# Patient Record
Sex: FEMALE | Race: WHITE
[De-identification: names, ages, dates, MRNs, and addresses within clinical notes are randomized per-mention and may not be internally consistent; named-entity substitution may affect disease eponyms.]

---

## 2018-01-30 ENCOUNTER — HOSPITAL ENCOUNTER (OUTPATIENT)
Age: 48
End: 2018-01-30
Payer: COMMERCIAL

## 2018-01-30 DIAGNOSIS — R80.9: Primary | ICD-10-CM

## 2018-01-30 LAB
APTT PPP: 32 SECONDS (ref 24.1–36.2)
PROTHROMBIN TIME (PROTIME)PT.: 12.9 SECONDS (ref 11.7–14.9)

## 2018-01-30 PROCEDURE — 85730 THROMBOPLASTIN TIME PARTIAL: CPT

## 2018-01-30 PROCEDURE — 36415 COLL VENOUS BLD VENIPUNCTURE: CPT

## 2018-01-30 PROCEDURE — 85610 PROTHROMBIN TIME: CPT

## 2018-02-01 ENCOUNTER — HOSPITAL ENCOUNTER (OUTPATIENT)
Age: 48
End: 2018-02-01
Payer: COMMERCIAL

## 2018-02-01 VITALS
OXYGEN SATURATION: 95 % | HEART RATE: 60 BPM | SYSTOLIC BLOOD PRESSURE: 102 MMHG | RESPIRATION RATE: 14 BRPM | DIASTOLIC BLOOD PRESSURE: 39 MMHG

## 2018-02-01 VITALS
DIASTOLIC BLOOD PRESSURE: 57 MMHG | SYSTOLIC BLOOD PRESSURE: 143 MMHG | OXYGEN SATURATION: 95 % | HEART RATE: 76 BPM | TEMPERATURE: 98.78 F | RESPIRATION RATE: 14 BRPM

## 2018-02-01 VITALS — BODY MASS INDEX: 46 KG/M2

## 2018-02-01 DIAGNOSIS — R80.9: Primary | ICD-10-CM

## 2018-02-01 PROCEDURE — 99157 MOD SED OTHER PHYS/QHP EA: CPT

## 2018-02-01 PROCEDURE — 88346 IMFLUOR 1ST 1ANTB STAIN PX: CPT

## 2018-02-01 PROCEDURE — 50200 RENAL BIOPSY PERQ: CPT

## 2018-02-01 PROCEDURE — A4216 STERILE WATER/SALINE, 10 ML: HCPCS

## 2018-02-01 PROCEDURE — 88305 TISSUE EXAM BY PATHOLOGIST: CPT

## 2018-02-01 PROCEDURE — 99156 MOD SED OTH PHYS/QHP 5/>YRS: CPT

## 2018-02-01 PROCEDURE — 88348 ELECTRON MICROSCOPY DX: CPT

## 2018-02-01 PROCEDURE — 88313 SPECIAL STAINS GROUP 2: CPT

## 2018-02-01 PROCEDURE — 77012 CT SCAN FOR NEEDLE BIOPSY: CPT

## 2018-02-13 ENCOUNTER — HOSPITAL ENCOUNTER (OUTPATIENT)
Age: 48
End: 2018-02-13
Payer: COMMERCIAL

## 2018-02-13 DIAGNOSIS — R80.9: Primary | ICD-10-CM

## 2018-02-13 PROCEDURE — 86335 IMMUNFIX E-PHORSIS/URINE/CSF: CPT

## 2018-02-13 PROCEDURE — 36415 COLL VENOUS BLD VENIPUNCTURE: CPT

## 2018-02-13 PROCEDURE — 86334 IMMUNOFIX E-PHORESIS SERUM: CPT

## 2018-02-13 PROCEDURE — 82784 ASSAY IGA/IGD/IGG/IGM EACH: CPT

## 2018-02-13 PROCEDURE — 84166 PROTEIN E-PHORESIS/URINE/CSF: CPT

## 2018-02-13 PROCEDURE — 84165 PROTEIN E-PHORESIS SERUM: CPT

## 2018-02-14 LAB
ALBUMIN SERPL-SCNC: 3.4 G/DL (ref 2.9–4.4)
ALBUMIN: 3.4 G/DL (ref 2.9–4.4)
ALPHA2 GLOB 24H MFR UR ELPH: 2.6 %
GAMMA GLOB SERPL ELPH-MCNC: 1.1 G/DL (ref 0.4–1.8)
GAMMA GLOBULIN: 1.1 G/DL (ref 0.4–1.8)
IGA SERPL-MCNC: 547 MG/DL (ref 87–352)
IGG SERPL-MCNC: 847 MG/DL (ref 700–1600)
IGM SERPL-MCNC: 56 MG/DL (ref 26–217)
IMMUNOGLOBULIN A: 547 MG/DL (ref 87–352)
IMMUNOGLOBULIN G: 847 MG/DL (ref 700–1600)
IMMUNOGLOBULIN M: 56 MG/DL (ref 26–217)
PROEL- TOTAL PROTEIN: 7 G/DL (ref 6–8.5)
PROT SERPL-MCNC: 7 G/DL (ref 6–8.5)

## 2018-02-15 LAB — PROT UR-MCNC: 591 MG/DL

## 2018-05-11 ENCOUNTER — HOSPITAL ENCOUNTER (OUTPATIENT)
Age: 48
End: 2018-05-11
Payer: COMMERCIAL

## 2018-05-11 DIAGNOSIS — Z12.31: Primary | ICD-10-CM

## 2018-05-11 PROCEDURE — 77063 BREAST TOMOSYNTHESIS BI: CPT

## 2018-05-11 PROCEDURE — 77067 SCR MAMMO BI INCL CAD: CPT

## 2018-10-22 ENCOUNTER — HOSPITAL ENCOUNTER (OUTPATIENT)
Age: 48
End: 2018-10-22
Payer: COMMERCIAL

## 2018-10-22 DIAGNOSIS — C90.00: Primary | ICD-10-CM

## 2018-10-22 PROCEDURE — 86900 BLOOD TYPING SEROLOGIC ABO: CPT

## 2018-10-22 PROCEDURE — 86850 RBC ANTIBODY SCREEN: CPT

## 2019-02-08 ENCOUNTER — HOSPITAL ENCOUNTER (OUTPATIENT)
Age: 49
End: 2019-02-08
Payer: COMMERCIAL

## 2019-02-08 VITALS — BODY MASS INDEX: 46 KG/M2

## 2019-02-08 DIAGNOSIS — J32.9: Primary | ICD-10-CM

## 2019-02-08 DIAGNOSIS — J02.9: ICD-10-CM

## 2019-02-08 PROCEDURE — 87070 CULTURE OTHR SPECIMN AEROBIC: CPT

## 2019-02-08 PROCEDURE — 87205 SMEAR GRAM STAIN: CPT

## 2019-07-11 ENCOUNTER — HOSPITAL ENCOUNTER (OUTPATIENT)
Age: 49
End: 2019-07-11
Payer: COMMERCIAL

## 2019-07-11 VITALS — BODY MASS INDEX: 46 KG/M2

## 2019-07-11 DIAGNOSIS — N18.6: Primary | ICD-10-CM

## 2019-07-11 PROCEDURE — 93970 EXTREMITY STUDY: CPT

## 2019-07-19 ENCOUNTER — HOSPITAL ENCOUNTER (EMERGENCY)
Dept: HOSPITAL 100 - ED | Age: 49
Discharge: ANOTHER HOSPITAL | End: 2019-07-19
Payer: COMMERCIAL

## 2019-07-19 VITALS
HEART RATE: 66 BPM | SYSTOLIC BLOOD PRESSURE: 137 MMHG | DIASTOLIC BLOOD PRESSURE: 91 MMHG | TEMPERATURE: 98.78 F | OXYGEN SATURATION: 99 % | RESPIRATION RATE: 20 BRPM

## 2019-07-19 VITALS
HEART RATE: 65 BPM | RESPIRATION RATE: 17 BRPM | SYSTOLIC BLOOD PRESSURE: 135 MMHG | DIASTOLIC BLOOD PRESSURE: 84 MMHG | OXYGEN SATURATION: 98 %

## 2019-07-19 VITALS
SYSTOLIC BLOOD PRESSURE: 136 MMHG | RESPIRATION RATE: 17 BRPM | HEART RATE: 65 BPM | DIASTOLIC BLOOD PRESSURE: 88 MMHG | OXYGEN SATURATION: 97 %

## 2019-07-19 VITALS — BODY MASS INDEX: 46 KG/M2 | BODY MASS INDEX: 40.8 KG/M2

## 2019-07-19 DIAGNOSIS — Z87.891: ICD-10-CM

## 2019-07-19 DIAGNOSIS — R55: Primary | ICD-10-CM

## 2019-07-19 DIAGNOSIS — N18.6: ICD-10-CM

## 2019-07-19 DIAGNOSIS — T82.42XA: ICD-10-CM

## 2019-07-19 DIAGNOSIS — M25.531: ICD-10-CM

## 2019-07-19 DIAGNOSIS — F41.9: ICD-10-CM

## 2019-07-19 DIAGNOSIS — Z79.899: ICD-10-CM

## 2019-07-19 DIAGNOSIS — F32.9: ICD-10-CM

## 2019-07-19 DIAGNOSIS — Z99.2: ICD-10-CM

## 2019-07-19 LAB
ABSOLUTE NUCLEATED RBC COUNT: 0 10^3/UL (ref 0–5)
ANION GAP: 6 (ref 5–15)
BUN SERPL-MCNC: 22 MG/DL (ref 7–18)
BUN/CREAT RATIO: 5.4 RATIO (ref 10–20)
CALCIUM SERPL-MCNC: 10.1 MG/DL (ref 8.5–10.1)
CARBON DIOXIDE: 29 MMOL/L (ref 21–32)
CHLORIDE: 96 MMOL/L (ref 98–107)
DEPRECATED RDW RBC: 47.4 FL (ref 35.1–43.9)
ERYTHROCYTE [DISTWIDTH] IN BLOOD: 14 % (ref 11.6–14.6)
EST GLOM FILT RATE - AFR AMER: 15 ML/MIN (ref 60–?)
ESTIMATED CREATININE CLEARANCE: 13.32 ML/MIN
GLUCOSE: 126 MG/DL (ref 74–106)
HCT VFR BLD AUTO: 40.1 % (ref 37–47)
HEMOGLOBIN: 13.2 G/DL (ref 12–15)
HGB BLD-MCNC: 13.2 G/DL (ref 12–15)
IMMATURE GRANULOCYTES COUNT: 0.07 X10^3/UL (ref 0–0)
MCV RBC: 92.8 FL (ref 81–99)
MEAN CORP HGB CONC: 32.9 G/DL (ref 32–36)
MEAN PLATELET VOL.: 8.7 FL (ref 6.2–12)
NRBC FLAGGED BY ANALYZER: 0 % (ref 0–5)
PLATELET # BLD: 265 K/MM3 (ref 150–450)
PLATELET COUNT: 265 K/MM3 (ref 150–450)
POTASSIUM: 4.5 MMOL/L (ref 3.5–5.1)
RBC # BLD AUTO: 4.32 M/MM3 (ref 4.2–5.4)
RBC DISTRIBUTION WIDTH CV: 14 % (ref 11.6–14.6)
RBC DISTRIBUTION WIDTH SD: 47.4 FL (ref 35.1–43.9)
WBC # BLD AUTO: 8.5 K/MM3 (ref 4.4–11)
WHITE BLOOD COUNT: 8.5 K/MM3 (ref 4.4–11)

## 2019-07-19 PROCEDURE — A4216 STERILE WATER/SALINE, 10 ML: HCPCS

## 2019-07-19 PROCEDURE — 99285 EMERGENCY DEPT VISIT HI MDM: CPT

## 2019-07-19 PROCEDURE — 93005 ELECTROCARDIOGRAM TRACING: CPT

## 2019-07-19 PROCEDURE — 80048 BASIC METABOLIC PNL TOTAL CA: CPT

## 2019-07-19 PROCEDURE — 85025 COMPLETE CBC W/AUTO DIFF WBC: CPT

## 2019-07-19 PROCEDURE — 84484 ASSAY OF TROPONIN QUANT: CPT

## 2019-07-19 PROCEDURE — 70450 CT HEAD/BRAIN W/O DYE: CPT

## 2019-07-19 PROCEDURE — 73110 X-RAY EXAM OF WRIST: CPT

## 2019-07-19 PROCEDURE — 71045 X-RAY EXAM CHEST 1 VIEW: CPT

## 2019-11-12 ENCOUNTER — HOSPITAL ENCOUNTER (EMERGENCY)
Dept: HOSPITAL 100 - ED | Age: 49
Discharge: HOME | End: 2019-11-12
Payer: COMMERCIAL

## 2019-11-12 VITALS
TEMPERATURE: 98 F | RESPIRATION RATE: 16 BRPM | HEART RATE: 65 BPM | SYSTOLIC BLOOD PRESSURE: 183 MMHG | OXYGEN SATURATION: 96 % | DIASTOLIC BLOOD PRESSURE: 105 MMHG

## 2019-11-12 VITALS
OXYGEN SATURATION: 98 % | SYSTOLIC BLOOD PRESSURE: 131 MMHG | DIASTOLIC BLOOD PRESSURE: 62 MMHG | HEART RATE: 77 BPM | RESPIRATION RATE: 16 BRPM

## 2019-11-12 VITALS — BODY MASS INDEX: 40.8 KG/M2 | BODY MASS INDEX: 43.3 KG/M2

## 2019-11-12 DIAGNOSIS — T82.838A: Primary | ICD-10-CM

## 2019-11-12 DIAGNOSIS — N18.6: ICD-10-CM

## 2019-11-12 DIAGNOSIS — Z79.899: ICD-10-CM

## 2019-11-12 LAB
APTT PPP: 31.2 SECONDS (ref 24.1–36.2)
DEPRECATED RDW RBC: 56.9 FL (ref 35.1–43.9)
ERYTHROCYTE [DISTWIDTH] IN BLOOD: 15.5 % (ref 11.6–14.6)
HCT VFR BLD AUTO: 27.2 % (ref 37–47)
HEMOGLOBIN: 8.9 G/DL (ref 12–15)
HGB BLD-MCNC: 8.9 G/DL (ref 12–15)
IMMATURE GRANULOCYTES COUNT: 0.07 X10^3/UL (ref 0–0)
MCV RBC: 102.3 FL (ref 81–99)
MEAN CORP HGB CONC: 32.7 G/DL (ref 32–36)
MEAN PLATELET VOL.: 8.9 FL (ref 6.2–12)
NRBC FLAGGED BY ANALYZER: 0 % (ref 0–5)
PLATELET # BLD: 262 K/MM3 (ref 150–450)
PLATELET COUNT: 262 K/MM3 (ref 150–450)
PROTHROMBIN TIME (PROTIME)PT.: 14 SECONDS (ref 11.7–14.9)
RBC # BLD AUTO: 2.66 M/MM3 (ref 4.2–5.4)
RBC DISTRIBUTION WIDTH CV: 15.5 % (ref 11.6–14.6)
RBC DISTRIBUTION WIDTH SD: 56.9 FL (ref 35.1–43.9)
WBC # BLD AUTO: 10.3 K/MM3 (ref 4.4–11)
WHITE BLOOD COUNT: 10.3 K/MM3 (ref 4.4–11)

## 2019-11-12 PROCEDURE — 85730 THROMBOPLASTIN TIME PARTIAL: CPT

## 2019-11-12 PROCEDURE — A4216 STERILE WATER/SALINE, 10 ML: HCPCS

## 2019-11-12 PROCEDURE — 99283 EMERGENCY DEPT VISIT LOW MDM: CPT

## 2019-11-12 PROCEDURE — 85025 COMPLETE CBC W/AUTO DIFF WBC: CPT

## 2019-11-12 PROCEDURE — 85610 PROTHROMBIN TIME: CPT

## 2019-11-27 ENCOUNTER — HOSPITAL ENCOUNTER (INPATIENT)
Dept: HOSPITAL 100 - ED | Age: 49
LOS: 7 days | Discharge: HOME | DRG: 314 | End: 2019-12-04
Payer: COMMERCIAL

## 2019-11-27 VITALS
OXYGEN SATURATION: 94 % | HEART RATE: 97 BPM | TEMPERATURE: 103.9 F | DIASTOLIC BLOOD PRESSURE: 81 MMHG | RESPIRATION RATE: 34 BRPM | SYSTOLIC BLOOD PRESSURE: 196 MMHG

## 2019-11-27 VITALS
SYSTOLIC BLOOD PRESSURE: 112 MMHG | HEART RATE: 90 BPM | RESPIRATION RATE: 30 BRPM | TEMPERATURE: 102.2 F | DIASTOLIC BLOOD PRESSURE: 79 MMHG | OXYGEN SATURATION: 93 %

## 2019-11-27 VITALS
DIASTOLIC BLOOD PRESSURE: 65 MMHG | HEART RATE: 94 BPM | OXYGEN SATURATION: 93 % | TEMPERATURE: 102.2 F | SYSTOLIC BLOOD PRESSURE: 135 MMHG | RESPIRATION RATE: 30 BRPM

## 2019-11-27 VITALS
OXYGEN SATURATION: 92 % | RESPIRATION RATE: 32 BRPM | DIASTOLIC BLOOD PRESSURE: 81 MMHG | HEART RATE: 99 BPM | SYSTOLIC BLOOD PRESSURE: 196 MMHG

## 2019-11-27 VITALS
HEART RATE: 106 BPM | TEMPERATURE: 102.92 F | OXYGEN SATURATION: 78 % | RESPIRATION RATE: 21 BRPM | SYSTOLIC BLOOD PRESSURE: 157 MMHG | DIASTOLIC BLOOD PRESSURE: 79 MMHG

## 2019-11-27 VITALS
DIASTOLIC BLOOD PRESSURE: 82 MMHG | HEART RATE: 102 BPM | OXYGEN SATURATION: 92 % | RESPIRATION RATE: 30 BRPM | SYSTOLIC BLOOD PRESSURE: 194 MMHG

## 2019-11-27 VITALS — OXYGEN SATURATION: 92 % | TEMPERATURE: 103.82 F

## 2019-11-27 VITALS — BODY MASS INDEX: 34.2 KG/M2 | BODY MASS INDEX: 43.3 KG/M2 | BODY MASS INDEX: 42.3 KG/M2 | BODY MASS INDEX: 45 KG/M2

## 2019-11-27 VITALS
OXYGEN SATURATION: 93 % | RESPIRATION RATE: 35 BRPM | SYSTOLIC BLOOD PRESSURE: 196 MMHG | HEART RATE: 103 BPM | DIASTOLIC BLOOD PRESSURE: 81 MMHG

## 2019-11-27 VITALS — OXYGEN SATURATION: 94 %

## 2019-11-27 DIAGNOSIS — G43.909: ICD-10-CM

## 2019-11-27 DIAGNOSIS — E87.1: ICD-10-CM

## 2019-11-27 DIAGNOSIS — A41.01: ICD-10-CM

## 2019-11-27 DIAGNOSIS — D63.1: ICD-10-CM

## 2019-11-27 DIAGNOSIS — J96.01: ICD-10-CM

## 2019-11-27 DIAGNOSIS — F41.9: ICD-10-CM

## 2019-11-27 DIAGNOSIS — R65.20: ICD-10-CM

## 2019-11-27 DIAGNOSIS — G47.33: ICD-10-CM

## 2019-11-27 DIAGNOSIS — E87.5: ICD-10-CM

## 2019-11-27 DIAGNOSIS — N18.6: ICD-10-CM

## 2019-11-27 DIAGNOSIS — Z87.891: ICD-10-CM

## 2019-11-27 DIAGNOSIS — T82.7XXA: Primary | ICD-10-CM

## 2019-11-27 DIAGNOSIS — K21.9: ICD-10-CM

## 2019-11-27 DIAGNOSIS — K92.2: ICD-10-CM

## 2019-11-27 DIAGNOSIS — Z94.84: ICD-10-CM

## 2019-11-27 DIAGNOSIS — F32.9: ICD-10-CM

## 2019-11-27 DIAGNOSIS — D50.0: ICD-10-CM

## 2019-11-27 DIAGNOSIS — E66.01: ICD-10-CM

## 2019-11-27 DIAGNOSIS — E85.9: ICD-10-CM

## 2019-11-27 DIAGNOSIS — Z99.2: ICD-10-CM

## 2019-11-27 DIAGNOSIS — I10: ICD-10-CM

## 2019-11-27 DIAGNOSIS — J15.211: ICD-10-CM

## 2019-11-27 LAB
ALANINE AMINOTRANSFER ALT/SGPT: 81 U/L (ref 13–56)
ALBUMIN SERPL-MCNC: 3.1 G/DL (ref 3.2–5)
ALKALINE PHOSPHATASE: 134 U/L (ref 45–117)
ANION GAP: 12 (ref 5–15)
APTT PPP: 41.4 SECONDS (ref 24.1–36.2)
AST(SGOT): 90 U/L (ref 15–37)
BUN SERPL-MCNC: 65 MG/DL (ref 7–18)
BUN/CREAT RATIO: 7.4 RATIO (ref 10–20)
CALCIUM SERPL-MCNC: 9.5 MG/DL (ref 8.5–10.1)
CARBON DIOXIDE: 27 MMOL/L (ref 21–32)
CHLORIDE: 95 MMOL/L (ref 98–107)
DEPRECATED RDW RBC: 52 FL (ref 35.1–43.9)
DIFFERENTIAL INDICATED: (no result)
ERYTHROCYTE [DISTWIDTH] IN BLOOD: 14.2 % (ref 11.6–14.6)
EST GLOM FILT RATE - AFR AMER: 6 ML/MIN (ref 60–?)
ESTIMATED CREATININE CLEARANCE: 8.24 ML/MIN
GLOBULIN: 4.5 G/DL (ref 2.2–4.2)
GLUCOSE: 121 MG/DL (ref 74–106)
HCT VFR BLD AUTO: 24.4 % (ref 37–47)
HEMOGLOBIN: 8.1 G/DL (ref 12–15)
HGB BLD-MCNC: 8.1 G/DL (ref 12–15)
IMMATURE GRANULOCYTES COUNT: 0.04 X10^3/UL (ref 0–0)
MCV RBC: 100 FL (ref 81–99)
MEAN CORP HGB CONC: 33.2 G/DL (ref 32–36)
MEAN PLATELET VOL.: 8.9 FL (ref 6.2–12)
NRBC FLAGGED BY ANALYZER: 0 % (ref 0–5)
PLATELET # BLD: 208 K/MM3 (ref 150–450)
PLATELET COUNT: 208 K/MM3 (ref 150–450)
POSITIVE DIFFERENTIAL: YES
POTASSIUM: 5 MMOL/L (ref 3.5–5.1)
PROTHROMBIN TIME (PROTIME)PT.: 16.3 SECONDS (ref 11.7–14.9)
RBC # BLD AUTO: 2.44 M/MM3 (ref 4.2–5.4)
RBC DISTRIBUTION WIDTH CV: 14.2 % (ref 11.6–14.6)
RBC DISTRIBUTION WIDTH SD: 52 FL (ref 35.1–43.9)
SCAN SMEAR PER REVIEW CRITERIA: (no result)
WBC # BLD AUTO: 11.9 K/MM3 (ref 4.4–11)
WHITE BLOOD COUNT: 11.9 K/MM3 (ref 4.4–11)

## 2019-11-27 PROCEDURE — 93312 ECHO TRANSESOPHAGEAL: CPT

## 2019-11-27 PROCEDURE — 97116 GAIT TRAINING THERAPY: CPT

## 2019-11-27 PROCEDURE — 85014 HEMATOCRIT: CPT

## 2019-11-27 PROCEDURE — 36415 COLL VENOUS BLD VENIPUNCTURE: CPT

## 2019-11-27 PROCEDURE — 94003 VENT MGMT INPAT SUBQ DAY: CPT

## 2019-11-27 PROCEDURE — 80048 BASIC METABOLIC PNL TOTAL CA: CPT

## 2019-11-27 PROCEDURE — 90937 HEMODIALYSIS REPEATED EVAL: CPT

## 2019-11-27 PROCEDURE — 87040 BLOOD CULTURE FOR BACTERIA: CPT

## 2019-11-27 PROCEDURE — 84443 ASSAY THYROID STIM HORMONE: CPT

## 2019-11-27 PROCEDURE — 94668 MNPJ CHEST WALL SBSQ: CPT

## 2019-11-27 PROCEDURE — G0463 HOSPITAL OUTPT CLINIC VISIT: HCPCS

## 2019-11-27 PROCEDURE — G0257 UNSCHED DIALYSIS ESRD PT HOS: HCPCS

## 2019-11-27 PROCEDURE — 86901 BLOOD TYPING SEROLOGIC RH(D): CPT

## 2019-11-27 PROCEDURE — 83735 ASSAY OF MAGNESIUM: CPT

## 2019-11-27 PROCEDURE — 99285 EMERGENCY DEPT VISIT HI MDM: CPT

## 2019-11-27 PROCEDURE — 86900 BLOOD TYPING SEROLOGIC ABO: CPT

## 2019-11-27 PROCEDURE — A4216 STERILE WATER/SALINE, 10 ML: HCPCS

## 2019-11-27 PROCEDURE — 84484 ASSAY OF TROPONIN QUANT: CPT

## 2019-11-27 PROCEDURE — 87633 RESP VIRUS 12-25 TARGETS: CPT

## 2019-11-27 PROCEDURE — 93306 TTE W/DOPPLER COMPLETE: CPT

## 2019-11-27 PROCEDURE — 87077 CULTURE AEROBIC IDENTIFY: CPT

## 2019-11-27 PROCEDURE — 99251: CPT

## 2019-11-27 PROCEDURE — 83605 ASSAY OF LACTIC ACID: CPT

## 2019-11-27 PROCEDURE — 94002 VENT MGMT INPAT INIT DAY: CPT

## 2019-11-27 PROCEDURE — 82274 ASSAY TEST FOR BLOOD FECAL: CPT

## 2019-11-27 PROCEDURE — 93005 ELECTROCARDIOGRAM TRACING: CPT

## 2019-11-27 PROCEDURE — 87186 SC STD MICRODIL/AGAR DIL: CPT

## 2019-11-27 PROCEDURE — 82803 BLOOD GASES ANY COMBINATION: CPT

## 2019-11-27 PROCEDURE — 85025 COMPLETE CBC W/AUTO DIFF WBC: CPT

## 2019-11-27 PROCEDURE — 86920 COMPATIBILITY TEST SPIN: CPT

## 2019-11-27 PROCEDURE — 94667 MNPJ CHEST WALL 1ST: CPT

## 2019-11-27 PROCEDURE — 84100 ASSAY OF PHOSPHORUS: CPT

## 2019-11-27 PROCEDURE — 87070 CULTURE OTHR SPECIMN AEROBIC: CPT

## 2019-11-27 PROCEDURE — 87149 DNA/RNA DIRECT PROBE: CPT

## 2019-11-27 PROCEDURE — 71275 CT ANGIOGRAPHY CHEST: CPT

## 2019-11-27 PROCEDURE — 97802 MEDICAL NUTRITION INDIV IN: CPT

## 2019-11-27 PROCEDURE — 93325 DOPPLER ECHO COLOR FLOW MAPG: CPT

## 2019-11-27 PROCEDURE — 36600 WITHDRAWAL OF ARTERIAL BLOOD: CPT

## 2019-11-27 PROCEDURE — 87205 SMEAR GRAM STAIN: CPT

## 2019-11-27 PROCEDURE — 71045 X-RAY EXAM CHEST 1 VIEW: CPT

## 2019-11-27 PROCEDURE — 76705 ECHO EXAM OF ABDOMEN: CPT

## 2019-11-27 PROCEDURE — 94640 AIRWAY INHALATION TREATMENT: CPT

## 2019-11-27 PROCEDURE — 93320 DOPPLER ECHO COMPLETE: CPT

## 2019-11-27 PROCEDURE — 97530 THERAPEUTIC ACTIVITIES: CPT

## 2019-11-27 PROCEDURE — 86850 RBC ANTIBODY SCREEN: CPT

## 2019-11-27 PROCEDURE — 83540 ASSAY OF IRON: CPT

## 2019-11-27 PROCEDURE — 83550 IRON BINDING TEST: CPT

## 2019-11-27 PROCEDURE — 82728 ASSAY OF FERRITIN: CPT

## 2019-11-27 PROCEDURE — 85610 PROTHROMBIN TIME: CPT

## 2019-11-27 PROCEDURE — 85018 HEMOGLOBIN: CPT

## 2019-11-27 PROCEDURE — 82607 VITAMIN B-12: CPT

## 2019-11-27 PROCEDURE — 85730 THROMBOPLASTIN TIME PARTIAL: CPT

## 2019-11-27 PROCEDURE — 97166 OT EVAL MOD COMPLEX 45 MIN: CPT

## 2019-11-27 PROCEDURE — 80053 COMPREHEN METABOLIC PANEL: CPT

## 2019-11-27 PROCEDURE — 82746 ASSAY OF FOLIC ACID SERUM: CPT

## 2019-11-27 PROCEDURE — 97162 PT EVAL MOD COMPLEX 30 MIN: CPT

## 2019-11-27 PROCEDURE — P9016 RBC LEUKOCYTES REDUCED: HCPCS

## 2019-11-27 PROCEDURE — 87804 INFLUENZA ASSAY W/OPTIC: CPT

## 2019-11-27 RX ADMIN — LEVOFLOXACIN 150 MG: 5 INJECTION, SOLUTION INTRAVENOUS at 23:19

## 2019-11-28 VITALS — RESPIRATION RATE: 23 BRPM | HEART RATE: 71 BPM

## 2019-11-28 VITALS
DIASTOLIC BLOOD PRESSURE: 68 MMHG | RESPIRATION RATE: 25 BRPM | OXYGEN SATURATION: 100 % | HEART RATE: 65 BPM | SYSTOLIC BLOOD PRESSURE: 116 MMHG | TEMPERATURE: 98.42 F

## 2019-11-28 VITALS — RESPIRATION RATE: 23 BRPM | OXYGEN SATURATION: 99 % | HEART RATE: 72 BPM

## 2019-11-28 VITALS
DIASTOLIC BLOOD PRESSURE: 61 MMHG | OXYGEN SATURATION: 98 % | RESPIRATION RATE: 28 BRPM | SYSTOLIC BLOOD PRESSURE: 115 MMHG | TEMPERATURE: 99.32 F | HEART RATE: 88 BPM

## 2019-11-28 VITALS
SYSTOLIC BLOOD PRESSURE: 134 MMHG | HEART RATE: 73 BPM | DIASTOLIC BLOOD PRESSURE: 71 MMHG | OXYGEN SATURATION: 96 % | RESPIRATION RATE: 17 BRPM

## 2019-11-28 VITALS
RESPIRATION RATE: 28 BRPM | HEART RATE: 89 BPM | TEMPERATURE: 99.6 F | DIASTOLIC BLOOD PRESSURE: 54 MMHG | OXYGEN SATURATION: 100 % | SYSTOLIC BLOOD PRESSURE: 100 MMHG

## 2019-11-28 VITALS
DIASTOLIC BLOOD PRESSURE: 62 MMHG | OXYGEN SATURATION: 97 % | RESPIRATION RATE: 20 BRPM | HEART RATE: 67 BPM | TEMPERATURE: 97.88 F | SYSTOLIC BLOOD PRESSURE: 122 MMHG

## 2019-11-28 VITALS
TEMPERATURE: 99.8 F | RESPIRATION RATE: 28 BRPM | HEART RATE: 83 BPM | OXYGEN SATURATION: 96 % | SYSTOLIC BLOOD PRESSURE: 147 MMHG | DIASTOLIC BLOOD PRESSURE: 64 MMHG

## 2019-11-28 VITALS — HEART RATE: 69 BPM

## 2019-11-28 VITALS — RESPIRATION RATE: 12 BRPM | OXYGEN SATURATION: 100 % | HEART RATE: 68 BPM

## 2019-11-28 VITALS
SYSTOLIC BLOOD PRESSURE: 105 MMHG | RESPIRATION RATE: 26 BRPM | HEART RATE: 85 BPM | DIASTOLIC BLOOD PRESSURE: 57 MMHG | OXYGEN SATURATION: 96 %

## 2019-11-28 VITALS
DIASTOLIC BLOOD PRESSURE: 63 MMHG | OXYGEN SATURATION: 98 % | HEART RATE: 78 BPM | RESPIRATION RATE: 22 BRPM | SYSTOLIC BLOOD PRESSURE: 107 MMHG

## 2019-11-28 VITALS
OXYGEN SATURATION: 99 % | RESPIRATION RATE: 21 BRPM | DIASTOLIC BLOOD PRESSURE: 70 MMHG | SYSTOLIC BLOOD PRESSURE: 109 MMHG | HEART RATE: 63 BPM

## 2019-11-28 VITALS — HEART RATE: 68 BPM | OXYGEN SATURATION: 94 % | SYSTOLIC BLOOD PRESSURE: 111 MMHG | DIASTOLIC BLOOD PRESSURE: 64 MMHG

## 2019-11-28 VITALS
OXYGEN SATURATION: 97 % | DIASTOLIC BLOOD PRESSURE: 61 MMHG | HEART RATE: 67 BPM | TEMPERATURE: 98.4 F | RESPIRATION RATE: 25 BRPM | SYSTOLIC BLOOD PRESSURE: 104 MMHG

## 2019-11-28 VITALS
SYSTOLIC BLOOD PRESSURE: 129 MMHG | RESPIRATION RATE: 34 BRPM | HEART RATE: 105 BPM | OXYGEN SATURATION: 99 % | DIASTOLIC BLOOD PRESSURE: 59 MMHG | TEMPERATURE: 102.5 F

## 2019-11-28 VITALS
HEART RATE: 86 BPM | TEMPERATURE: 101.84 F | OXYGEN SATURATION: 93 % | SYSTOLIC BLOOD PRESSURE: 130 MMHG | DIASTOLIC BLOOD PRESSURE: 74 MMHG | RESPIRATION RATE: 27 BRPM

## 2019-11-28 VITALS
DIASTOLIC BLOOD PRESSURE: 79 MMHG | SYSTOLIC BLOOD PRESSURE: 120 MMHG | OXYGEN SATURATION: 97 % | RESPIRATION RATE: 20 BRPM | HEART RATE: 73 BPM

## 2019-11-28 VITALS — OXYGEN SATURATION: 96 %

## 2019-11-28 VITALS
SYSTOLIC BLOOD PRESSURE: 112 MMHG | DIASTOLIC BLOOD PRESSURE: 71 MMHG | RESPIRATION RATE: 22 BRPM | HEART RATE: 62 BPM | OXYGEN SATURATION: 100 %

## 2019-11-28 VITALS
HEART RATE: 76 BPM | RESPIRATION RATE: 22 BRPM | OXYGEN SATURATION: 99 % | DIASTOLIC BLOOD PRESSURE: 65 MMHG | TEMPERATURE: 98.4 F | SYSTOLIC BLOOD PRESSURE: 107 MMHG

## 2019-11-28 VITALS
DIASTOLIC BLOOD PRESSURE: 73 MMHG | RESPIRATION RATE: 27 BRPM | SYSTOLIC BLOOD PRESSURE: 118 MMHG | HEART RATE: 88 BPM | TEMPERATURE: 99.5 F | OXYGEN SATURATION: 94 %

## 2019-11-28 VITALS — HEART RATE: 82 BPM

## 2019-11-28 VITALS
HEART RATE: 69 BPM | OXYGEN SATURATION: 97 % | DIASTOLIC BLOOD PRESSURE: 66 MMHG | RESPIRATION RATE: 25 BRPM | SYSTOLIC BLOOD PRESSURE: 109 MMHG

## 2019-11-28 VITALS
RESPIRATION RATE: 24 BRPM | SYSTOLIC BLOOD PRESSURE: 108 MMHG | HEART RATE: 65 BPM | OXYGEN SATURATION: 96 % | DIASTOLIC BLOOD PRESSURE: 77 MMHG

## 2019-11-28 VITALS
DIASTOLIC BLOOD PRESSURE: 72 MMHG | OXYGEN SATURATION: 98 % | HEART RATE: 100 BPM | RESPIRATION RATE: 28 BRPM | TEMPERATURE: 102.6 F | SYSTOLIC BLOOD PRESSURE: 110 MMHG

## 2019-11-28 VITALS
RESPIRATION RATE: 22 BRPM | SYSTOLIC BLOOD PRESSURE: 116 MMHG | OXYGEN SATURATION: 97 % | DIASTOLIC BLOOD PRESSURE: 65 MMHG | HEART RATE: 66 BPM

## 2019-11-28 VITALS — RESPIRATION RATE: 24 BRPM | HEART RATE: 108 BPM

## 2019-11-28 VITALS
RESPIRATION RATE: 22 BRPM | HEART RATE: 67 BPM | DIASTOLIC BLOOD PRESSURE: 64 MMHG | SYSTOLIC BLOOD PRESSURE: 116 MMHG | OXYGEN SATURATION: 100 %

## 2019-11-28 VITALS — RESPIRATION RATE: 26 BRPM | HEART RATE: 82 BPM | OXYGEN SATURATION: 99 %

## 2019-11-28 VITALS
DIASTOLIC BLOOD PRESSURE: 77 MMHG | RESPIRATION RATE: 27 BRPM | OXYGEN SATURATION: 96 % | HEART RATE: 67 BPM | SYSTOLIC BLOOD PRESSURE: 120 MMHG

## 2019-11-28 VITALS
HEART RATE: 80 BPM | TEMPERATURE: 100.3 F | RESPIRATION RATE: 28 BRPM | OXYGEN SATURATION: 94 % | DIASTOLIC BLOOD PRESSURE: 60 MMHG | SYSTOLIC BLOOD PRESSURE: 110 MMHG

## 2019-11-28 VITALS
OXYGEN SATURATION: 97 % | HEART RATE: 83 BPM | RESPIRATION RATE: 23 BRPM | DIASTOLIC BLOOD PRESSURE: 67 MMHG | SYSTOLIC BLOOD PRESSURE: 107 MMHG

## 2019-11-28 VITALS
SYSTOLIC BLOOD PRESSURE: 95 MMHG | HEART RATE: 87 BPM | RESPIRATION RATE: 28 BRPM | TEMPERATURE: 100.2 F | DIASTOLIC BLOOD PRESSURE: 54 MMHG | OXYGEN SATURATION: 95 %

## 2019-11-28 VITALS — OXYGEN SATURATION: 62 %

## 2019-11-28 VITALS — HEART RATE: 100 BPM

## 2019-11-28 VITALS — RESPIRATION RATE: 22 BRPM | HEART RATE: 67 BPM

## 2019-11-28 VITALS — HEART RATE: 90 BPM | RESPIRATION RATE: 24 BRPM | OXYGEN SATURATION: 99 %

## 2019-11-28 VITALS
DIASTOLIC BLOOD PRESSURE: 57 MMHG | RESPIRATION RATE: 23 BRPM | HEART RATE: 83 BPM | OXYGEN SATURATION: 99 % | SYSTOLIC BLOOD PRESSURE: 105 MMHG

## 2019-11-28 VITALS — OXYGEN SATURATION: 100 %

## 2019-11-28 VITALS — HEART RATE: 65 BPM | OXYGEN SATURATION: 99 % | RESPIRATION RATE: 25 BRPM

## 2019-11-28 LAB
ALLEN TEST: (no result)
ANION GAP: 13 (ref 5–15)
ARTERIAL PATENCY WRIST A: (no result)
BASE EXCESS BLDV CALC-SCNC: 2 MMOL/L
BUN SERPL-MCNC: 70 MG/DL (ref 7–18)
BUN/CREAT RATIO: 7.5 RATIO (ref 10–20)
CALCIUM SERPL-MCNC: 9.2 MG/DL (ref 8.5–10.1)
CARBON DIOXIDE: 25 MMOL/L (ref 21–32)
CHLORIDE: 96 MMOL/L (ref 98–107)
DEPRECATED RDW RBC: 51.8 FL (ref 35.1–43.9)
DIFFERENTIAL COMMENT: (no result)
DIFFERENTIAL INDICATED: (no result)
ERYTHROCYTE [DISTWIDTH] IN BLOOD: 14.1 % (ref 11.6–14.6)
EST GLOM FILT RATE - AFR AMER: 6 ML/MIN (ref 60–?)
ESTIMATED CREATININE CLEARANCE: 5.69 ML/MIN
FERRITIN SERPL-MCNC: 927 NG/ML (ref 8–252)
FI02: 40
FOLATES, (FOLIC ACID): 15.7 NG/ML (ref 3.1–55.4)
GLUCOSE: 104 MG/DL (ref 74–106)
HCT VFR BLD AUTO: 21.6 % (ref 37–47)
HCT VFR BLD AUTO: 22.3 % (ref 37–47)
HEMOGLOBIN: 7.1 G/DL (ref 12–15)
HEMOGLOBIN: 7.2 G/DL (ref 12–15)
HGB BLD-MCNC: 7.1 G/DL (ref 12–15)
HGB BLD-MCNC: 7.2 G/DL (ref 12–15)
HYPOCHROMASIA: (no result)
HYPOCHROMIA BLD QL: (no result)
IMMATURE GRANULOCYTES COUNT: 0.06 X10^3/UL (ref 0–0)
IRON BINDING CAPACITY,TOTAL: 187 UG/DL (ref 250–450)
IRON SATN MFR SERPL: 8 % (ref 15–55)
IRON SPEC-MCNT: 15 UG/DL (ref 50–170)
MAGNESIUM: 1.7 MG/DL (ref 1.6–2.6)
MANUAL DIF COMMENT BLD-IMP: (no result)
MCV RBC: 100.5 FL (ref 81–99)
MEAN CORP HGB CONC: 32.3 G/DL (ref 32–36)
MEAN PLATELET VOL.: 8.9 FL (ref 6.2–12)
MICROCYTOSIS: (no result)
NRBC FLAGGED BY ANALYZER: 0 % (ref 0–5)
PLATELET # BLD: 179 K/MM3 (ref 150–450)
PLATELET COUNT: 179 K/MM3 (ref 150–450)
PO2 BLDA: 65 MMHG (ref 75–100)
POSITIVE DIFFERENTIAL: YES
POSITIVE MORPHOLOGY: YES
POTASSIUM: 4.6 MMOL/L (ref 3.5–5.1)
RBC # BLD AUTO: 2.22 M/MM3 (ref 4.2–5.4)
RBC DISTRIBUTION WIDTH CV: 14.1 % (ref 11.6–14.6)
RBC DISTRIBUTION WIDTH SD: 51.8 FL (ref 35.1–43.9)
SCAN SMEAR PER REVIEW CRITERIA: (no result)
SO2: 93 % (ref 95–99)
TIME GIVEN: 851
WBC # BLD AUTO: 8.6 K/MM3 (ref 4.4–11)
WHITE BLOOD COUNT: 8.6 K/MM3 (ref 4.4–11)

## 2019-11-28 RX ADMIN — NIFEDIPINE 90 MG: 90 TABLET, EXTENDED RELEASE ORAL at 11:15

## 2019-11-28 RX ADMIN — ALBUTEROL SULFATE 2.5 MG: 2.5 SOLUTION RESPIRATORY (INHALATION) at 02:59

## 2019-11-28 RX ADMIN — HEPARIN SODIUM 5000 UNIT: 5000 INJECTION, SOLUTION INTRAVENOUS; SUBCUTANEOUS at 21:36

## 2019-11-28 RX ADMIN — SEVELAMER CARBONATE 800 MG: 800 TABLET, FILM COATED ORAL at 11:14

## 2019-11-28 RX ADMIN — VANCOMYCIN HYDROCHLORIDE 200 MG: 1 INJECTION, SOLUTION INTRAVENOUS at 01:10

## 2019-11-28 RX ADMIN — HEPARIN SODIUM 5000 UNIT: 5000 INJECTION, SOLUTION INTRAVENOUS; SUBCUTANEOUS at 10:16

## 2019-11-29 VITALS — HEART RATE: 71 BPM | RESPIRATION RATE: 12 BRPM | OXYGEN SATURATION: 100 %

## 2019-11-29 VITALS
SYSTOLIC BLOOD PRESSURE: 101 MMHG | RESPIRATION RATE: 23 BRPM | DIASTOLIC BLOOD PRESSURE: 87 MMHG | OXYGEN SATURATION: 96 % | HEART RATE: 70 BPM

## 2019-11-29 VITALS
HEART RATE: 68 BPM | OXYGEN SATURATION: 99 % | RESPIRATION RATE: 19 BRPM | SYSTOLIC BLOOD PRESSURE: 120 MMHG | DIASTOLIC BLOOD PRESSURE: 75 MMHG

## 2019-11-29 VITALS
SYSTOLIC BLOOD PRESSURE: 114 MMHG | HEART RATE: 75 BPM | DIASTOLIC BLOOD PRESSURE: 69 MMHG | RESPIRATION RATE: 26 BRPM | OXYGEN SATURATION: 95 %

## 2019-11-29 VITALS
RESPIRATION RATE: 28 BRPM | DIASTOLIC BLOOD PRESSURE: 56 MMHG | TEMPERATURE: 98.06 F | SYSTOLIC BLOOD PRESSURE: 90 MMHG | OXYGEN SATURATION: 94 % | HEART RATE: 77 BPM

## 2019-11-29 VITALS
SYSTOLIC BLOOD PRESSURE: 78 MMHG | DIASTOLIC BLOOD PRESSURE: 57 MMHG | RESPIRATION RATE: 26 BRPM | HEART RATE: 75 BPM | OXYGEN SATURATION: 93 %

## 2019-11-29 VITALS
HEART RATE: 82 BPM | SYSTOLIC BLOOD PRESSURE: 138 MMHG | DIASTOLIC BLOOD PRESSURE: 81 MMHG | OXYGEN SATURATION: 95 % | RESPIRATION RATE: 24 BRPM

## 2019-11-29 VITALS
DIASTOLIC BLOOD PRESSURE: 89 MMHG | RESPIRATION RATE: 20 BRPM | HEART RATE: 77 BPM | OXYGEN SATURATION: 91 % | SYSTOLIC BLOOD PRESSURE: 123 MMHG

## 2019-11-29 VITALS
RESPIRATION RATE: 19 BRPM | SYSTOLIC BLOOD PRESSURE: 113 MMHG | OXYGEN SATURATION: 94 % | HEART RATE: 69 BPM | DIASTOLIC BLOOD PRESSURE: 79 MMHG | TEMPERATURE: 98.42 F

## 2019-11-29 VITALS
HEART RATE: 76 BPM | OXYGEN SATURATION: 98 % | RESPIRATION RATE: 26 BRPM | SYSTOLIC BLOOD PRESSURE: 122 MMHG | DIASTOLIC BLOOD PRESSURE: 75 MMHG

## 2019-11-29 VITALS
SYSTOLIC BLOOD PRESSURE: 113 MMHG | DIASTOLIC BLOOD PRESSURE: 72 MMHG | HEART RATE: 71 BPM | OXYGEN SATURATION: 97 % | RESPIRATION RATE: 22 BRPM

## 2019-11-29 VITALS
DIASTOLIC BLOOD PRESSURE: 69 MMHG | OXYGEN SATURATION: 98 % | HEART RATE: 77 BPM | SYSTOLIC BLOOD PRESSURE: 119 MMHG | RESPIRATION RATE: 26 BRPM

## 2019-11-29 VITALS
HEART RATE: 71 BPM | TEMPERATURE: 98.06 F | DIASTOLIC BLOOD PRESSURE: 72 MMHG | RESPIRATION RATE: 16 BRPM | OXYGEN SATURATION: 94 % | SYSTOLIC BLOOD PRESSURE: 126 MMHG

## 2019-11-29 VITALS
HEART RATE: 65 BPM | DIASTOLIC BLOOD PRESSURE: 68 MMHG | RESPIRATION RATE: 20 BRPM | OXYGEN SATURATION: 99 % | SYSTOLIC BLOOD PRESSURE: 108 MMHG

## 2019-11-29 VITALS
RESPIRATION RATE: 21 BRPM | HEART RATE: 65 BPM | SYSTOLIC BLOOD PRESSURE: 111 MMHG | DIASTOLIC BLOOD PRESSURE: 72 MMHG | OXYGEN SATURATION: 100 %

## 2019-11-29 VITALS
DIASTOLIC BLOOD PRESSURE: 77 MMHG | OXYGEN SATURATION: 97 % | SYSTOLIC BLOOD PRESSURE: 130 MMHG | HEART RATE: 73 BPM | RESPIRATION RATE: 26 BRPM

## 2019-11-29 VITALS — OXYGEN SATURATION: 97 % | RESPIRATION RATE: 20 BRPM | HEART RATE: 69 BPM

## 2019-11-29 VITALS — HEART RATE: 72 BPM

## 2019-11-29 VITALS
RESPIRATION RATE: 23 BRPM | HEART RATE: 87 BPM | DIASTOLIC BLOOD PRESSURE: 61 MMHG | SYSTOLIC BLOOD PRESSURE: 104 MMHG | OXYGEN SATURATION: 93 %

## 2019-11-29 VITALS
HEART RATE: 87 BPM | RESPIRATION RATE: 35 BRPM | OXYGEN SATURATION: 90 % | SYSTOLIC BLOOD PRESSURE: 99 MMHG | DIASTOLIC BLOOD PRESSURE: 59 MMHG

## 2019-11-29 VITALS
OXYGEN SATURATION: 92 % | RESPIRATION RATE: 20 BRPM | DIASTOLIC BLOOD PRESSURE: 69 MMHG | TEMPERATURE: 97.88 F | HEART RATE: 83 BPM | SYSTOLIC BLOOD PRESSURE: 111 MMHG

## 2019-11-29 VITALS
SYSTOLIC BLOOD PRESSURE: 97 MMHG | RESPIRATION RATE: 29 BRPM | OXYGEN SATURATION: 91 % | DIASTOLIC BLOOD PRESSURE: 63 MMHG | HEART RATE: 77 BPM

## 2019-11-29 VITALS — HEART RATE: 82 BPM

## 2019-11-29 VITALS
TEMPERATURE: 97.52 F | RESPIRATION RATE: 16 BRPM | SYSTOLIC BLOOD PRESSURE: 96 MMHG | HEART RATE: 79 BPM | OXYGEN SATURATION: 91 % | DIASTOLIC BLOOD PRESSURE: 86 MMHG

## 2019-11-29 VITALS
HEART RATE: 75 BPM | OXYGEN SATURATION: 93 % | SYSTOLIC BLOOD PRESSURE: 97 MMHG | RESPIRATION RATE: 25 BRPM | TEMPERATURE: 98.24 F | DIASTOLIC BLOOD PRESSURE: 63 MMHG

## 2019-11-29 VITALS
TEMPERATURE: 98 F | OXYGEN SATURATION: 97 % | SYSTOLIC BLOOD PRESSURE: 134 MMHG | HEART RATE: 74 BPM | DIASTOLIC BLOOD PRESSURE: 79 MMHG | RESPIRATION RATE: 22 BRPM

## 2019-11-29 VITALS
SYSTOLIC BLOOD PRESSURE: 113 MMHG | RESPIRATION RATE: 23 BRPM | DIASTOLIC BLOOD PRESSURE: 70 MMHG | HEART RATE: 77 BPM | OXYGEN SATURATION: 92 %

## 2019-11-29 VITALS — HEART RATE: 70 BPM | RESPIRATION RATE: 26 BRPM | OXYGEN SATURATION: 98 %

## 2019-11-29 VITALS — OXYGEN SATURATION: 95 % | HEART RATE: 81 BPM | RESPIRATION RATE: 12 BRPM

## 2019-11-29 VITALS
SYSTOLIC BLOOD PRESSURE: 128 MMHG | DIASTOLIC BLOOD PRESSURE: 71 MMHG | HEART RATE: 78 BPM | OXYGEN SATURATION: 91 % | RESPIRATION RATE: 24 BRPM

## 2019-11-29 VITALS — HEART RATE: 75 BPM | RESPIRATION RATE: 18 BRPM

## 2019-11-29 VITALS — HEART RATE: 83 BPM | RESPIRATION RATE: 29 BRPM

## 2019-11-29 VITALS — HEART RATE: 81 BPM

## 2019-11-29 VITALS — HEART RATE: 64 BPM

## 2019-11-29 LAB
ALANINE AMINOTRANSFER ALT/SGPT: 55 U/L (ref 13–56)
ALBUMIN SERPL-MCNC: 2.5 G/DL (ref 3.2–5)
ALKALINE PHOSPHATASE: 100 U/L (ref 45–117)
ANION GAP: 11 (ref 5–15)
AST(SGOT): 38 U/L (ref 15–37)
BUN SERPL-MCNC: 87 MG/DL (ref 7–18)
BUN/CREAT RATIO: 7.9 RATIO (ref 10–20)
CALCIUM SERPL-MCNC: 9.1 MG/DL (ref 8.5–10.1)
CARBON DIOXIDE: 26 MMOL/L (ref 21–32)
CHLORIDE: 94 MMOL/L (ref 98–107)
DEPRECATED RDW RBC: 52.3 FL (ref 35.1–43.9)
ERYTHROCYTE [DISTWIDTH] IN BLOOD: 14.4 % (ref 11.6–14.6)
EST GLOM FILT RATE - AFR AMER: 5 ML/MIN (ref 60–?)
ESTIMATED CREATININE CLEARANCE: 4.84 ML/MIN
GLOBULIN: 4.1 G/DL (ref 2.2–4.2)
GLUCOSE: 84 MG/DL (ref 74–106)
HCT VFR BLD AUTO: 19.8 % (ref 37–47)
HEMOGLOBIN: 6.4 G/DL (ref 12–15)
HGB BLD-MCNC: 6.4 G/DL (ref 12–15)
IMMATURE GRANULOCYTES COUNT: 0.05 X10^3/UL (ref 0–0)
MAGNESIUM: 2.2 MG/DL (ref 1.6–2.6)
MCV RBC: 101 FL (ref 81–99)
MEAN CORP HGB CONC: 32.3 G/DL (ref 32–36)
MEAN PLATELET VOL.: 9.4 FL (ref 6.2–12)
NRBC FLAGGED BY ANALYZER: 0 % (ref 0–5)
PLATELET # BLD: 152 K/MM3 (ref 150–450)
PLATELET COUNT: 152 K/MM3 (ref 150–450)
POTASSIUM: 5.2 MMOL/L (ref 3.5–5.1)
RBC # BLD AUTO: 1.96 M/MM3 (ref 4.2–5.4)
RBC DISTRIBUTION WIDTH CV: 14.4 % (ref 11.6–14.6)
RBC DISTRIBUTION WIDTH SD: 52.3 FL (ref 35.1–43.9)
VITAMIN B12: 474 PG/ML (ref 211–911)
WBC # BLD AUTO: 9.3 K/MM3 (ref 4.4–11)
WHITE BLOOD COUNT: 9.3 K/MM3 (ref 4.4–11)

## 2019-11-29 RX ADMIN — SEVELAMER CARBONATE 2400 MG: 800 TABLET, FILM COATED ORAL at 18:46

## 2019-11-29 RX ADMIN — NIFEDIPINE 90 MG: 90 TABLET, EXTENDED RELEASE ORAL at 10:33

## 2019-11-29 RX ADMIN — SEVELAMER CARBONATE 800 MG: 800 TABLET, FILM COATED ORAL at 08:45

## 2019-11-29 RX ADMIN — CEFAZOLIN SODIUM 100 GM: 1 INJECTION, SOLUTION INTRAVENOUS at 21:04

## 2019-11-30 VITALS
HEART RATE: 70 BPM | OXYGEN SATURATION: 95 % | DIASTOLIC BLOOD PRESSURE: 73 MMHG | RESPIRATION RATE: 25 BRPM | SYSTOLIC BLOOD PRESSURE: 114 MMHG | TEMPERATURE: 96.9 F

## 2019-11-30 VITALS
HEART RATE: 88 BPM | OXYGEN SATURATION: 98 % | RESPIRATION RATE: 21 BRPM | SYSTOLIC BLOOD PRESSURE: 117 MMHG | DIASTOLIC BLOOD PRESSURE: 76 MMHG

## 2019-11-30 VITALS
OXYGEN SATURATION: 93 % | TEMPERATURE: 98.3 F | SYSTOLIC BLOOD PRESSURE: 135 MMHG | DIASTOLIC BLOOD PRESSURE: 79 MMHG | RESPIRATION RATE: 18 BRPM | HEART RATE: 80 BPM

## 2019-11-30 VITALS
TEMPERATURE: 98.2 F | OXYGEN SATURATION: 97 % | HEART RATE: 70 BPM | SYSTOLIC BLOOD PRESSURE: 113 MMHG | RESPIRATION RATE: 15 BRPM | DIASTOLIC BLOOD PRESSURE: 71 MMHG

## 2019-11-30 VITALS
DIASTOLIC BLOOD PRESSURE: 65 MMHG | HEART RATE: 76 BPM | RESPIRATION RATE: 18 BRPM | OXYGEN SATURATION: 94 % | SYSTOLIC BLOOD PRESSURE: 133 MMHG | TEMPERATURE: 98.24 F

## 2019-11-30 VITALS
OXYGEN SATURATION: 94 % | HEART RATE: 70 BPM | DIASTOLIC BLOOD PRESSURE: 78 MMHG | RESPIRATION RATE: 20 BRPM | SYSTOLIC BLOOD PRESSURE: 128 MMHG

## 2019-11-30 VITALS — HEART RATE: 82 BPM | RESPIRATION RATE: 18 BRPM

## 2019-11-30 VITALS
HEART RATE: 69 BPM | OXYGEN SATURATION: 98 % | RESPIRATION RATE: 20 BRPM | DIASTOLIC BLOOD PRESSURE: 64 MMHG | TEMPERATURE: 97.52 F | SYSTOLIC BLOOD PRESSURE: 108 MMHG

## 2019-11-30 VITALS — RESPIRATION RATE: 22 BRPM | HEART RATE: 72 BPM

## 2019-11-30 VITALS
SYSTOLIC BLOOD PRESSURE: 116 MMHG | OXYGEN SATURATION: 99 % | RESPIRATION RATE: 21 BRPM | DIASTOLIC BLOOD PRESSURE: 72 MMHG | HEART RATE: 67 BPM

## 2019-11-30 VITALS — HEART RATE: 75 BPM

## 2019-11-30 VITALS
SYSTOLIC BLOOD PRESSURE: 124 MMHG | RESPIRATION RATE: 12 BRPM | OXYGEN SATURATION: 96 % | DIASTOLIC BLOOD PRESSURE: 80 MMHG | HEART RATE: 69 BPM

## 2019-11-30 VITALS
HEART RATE: 67 BPM | SYSTOLIC BLOOD PRESSURE: 115 MMHG | DIASTOLIC BLOOD PRESSURE: 71 MMHG | RESPIRATION RATE: 18 BRPM | OXYGEN SATURATION: 98 %

## 2019-11-30 VITALS
RESPIRATION RATE: 26 BRPM | SYSTOLIC BLOOD PRESSURE: 109 MMHG | HEART RATE: 69 BPM | OXYGEN SATURATION: 94 % | DIASTOLIC BLOOD PRESSURE: 68 MMHG

## 2019-11-30 VITALS
OXYGEN SATURATION: 99 % | DIASTOLIC BLOOD PRESSURE: 67 MMHG | SYSTOLIC BLOOD PRESSURE: 117 MMHG | HEART RATE: 68 BPM | RESPIRATION RATE: 12 BRPM

## 2019-11-30 VITALS — OXYGEN SATURATION: 99 % | HEART RATE: 65 BPM | RESPIRATION RATE: 12 BRPM

## 2019-11-30 VITALS — RESPIRATION RATE: 12 BRPM | HEART RATE: 64 BPM | OXYGEN SATURATION: 98 %

## 2019-11-30 VITALS — RESPIRATION RATE: 20 BRPM | HEART RATE: 69 BPM | OXYGEN SATURATION: 95 %

## 2019-11-30 VITALS — HEART RATE: 80 BPM

## 2019-11-30 VITALS
RESPIRATION RATE: 15 BRPM | DIASTOLIC BLOOD PRESSURE: 69 MMHG | SYSTOLIC BLOOD PRESSURE: 107 MMHG | HEART RATE: 65 BPM | OXYGEN SATURATION: 94 %

## 2019-11-30 VITALS — HEART RATE: 72 BPM

## 2019-11-30 VITALS — HEART RATE: 71 BPM

## 2019-11-30 VITALS — RESPIRATION RATE: 24 BRPM | HEART RATE: 73 BPM

## 2019-11-30 LAB
ANION GAP: 10 (ref 5–15)
BUN SERPL-MCNC: 49 MG/DL (ref 7–18)
BUN/CREAT RATIO: 6.7 RATIO (ref 10–20)
CALCIUM SERPL-MCNC: 8.7 MG/DL (ref 8.5–10.1)
CARBON DIOXIDE: 28 MMOL/L (ref 21–32)
CHLORIDE: 97 MMOL/L (ref 98–107)
DEPRECATED RDW RBC: 56.7 FL (ref 35.1–43.9)
ERYTHROCYTE [DISTWIDTH] IN BLOOD: 15.9 % (ref 11.6–14.6)
EST GLOM FILT RATE - AFR AMER: 8 ML/MIN (ref 60–?)
ESTIMATED CREATININE CLEARANCE: 7.27 ML/MIN
GLUCOSE: 109 MG/DL (ref 74–106)
HCT VFR BLD AUTO: 22.8 % (ref 37–47)
HEMOGLOBIN: 7.6 G/DL (ref 12–15)
HGB BLD-MCNC: 7.6 G/DL (ref 12–15)
IMMATURE GRANULOCYTES COUNT: 0.05 X10^3/UL (ref 0–0)
MCV RBC: 96.2 FL (ref 81–99)
MEAN CORP HGB CONC: 33.3 G/DL (ref 32–36)
MEAN PLATELET VOL.: 9.5 FL (ref 6.2–12)
NRBC FLAGGED BY ANALYZER: 0 % (ref 0–5)
PLATELET # BLD: 152 K/MM3 (ref 150–450)
PLATELET COUNT: 152 K/MM3 (ref 150–450)
POTASSIUM: 4.1 MMOL/L (ref 3.5–5.1)
RBC # BLD AUTO: 2.37 M/MM3 (ref 4.2–5.4)
RBC DISTRIBUTION WIDTH CV: 15.9 % (ref 11.6–14.6)
RBC DISTRIBUTION WIDTH SD: 56.7 FL (ref 35.1–43.9)
WBC # BLD AUTO: 6.7 K/MM3 (ref 4.4–11)
WHITE BLOOD COUNT: 6.7 K/MM3 (ref 4.4–11)

## 2019-11-30 RX ADMIN — SODIUM CHLORIDE, PRESERVATIVE FREE 0 ML: 5 INJECTION INTRAVENOUS at 21:23

## 2019-11-30 RX ADMIN — CEFAZOLIN SODIUM 100 GM: 1 INJECTION, SOLUTION INTRAVENOUS at 21:20

## 2019-11-30 RX ADMIN — SEVELAMER CARBONATE 2400 MG: 800 TABLET, FILM COATED ORAL at 09:07

## 2019-11-30 RX ADMIN — SEVELAMER CARBONATE 2400 MG: 800 TABLET, FILM COATED ORAL at 12:15

## 2019-11-30 RX ADMIN — NIFEDIPINE 90 MG: 90 TABLET, EXTENDED RELEASE ORAL at 09:07

## 2019-11-30 RX ADMIN — SEVELAMER CARBONATE 2400 MG: 800 TABLET, FILM COATED ORAL at 16:50

## 2019-12-01 VITALS
DIASTOLIC BLOOD PRESSURE: 81 MMHG | TEMPERATURE: 98.6 F | HEART RATE: 78 BPM | SYSTOLIC BLOOD PRESSURE: 147 MMHG | RESPIRATION RATE: 16 BRPM | OXYGEN SATURATION: 94 %

## 2019-12-01 VITALS
SYSTOLIC BLOOD PRESSURE: 134 MMHG | RESPIRATION RATE: 17 BRPM | DIASTOLIC BLOOD PRESSURE: 82 MMHG | OXYGEN SATURATION: 97 % | TEMPERATURE: 98.96 F | HEART RATE: 72 BPM

## 2019-12-01 VITALS — RESPIRATION RATE: 12 BRPM

## 2019-12-01 VITALS — RESPIRATION RATE: 18 BRPM | HEART RATE: 80 BPM

## 2019-12-01 VITALS — HEART RATE: 82 BPM

## 2019-12-01 VITALS — HEART RATE: 74 BPM

## 2019-12-01 VITALS — OXYGEN SATURATION: 92 % | HEART RATE: 80 BPM | RESPIRATION RATE: 18 BRPM

## 2019-12-01 VITALS
RESPIRATION RATE: 18 BRPM | OXYGEN SATURATION: 92 % | SYSTOLIC BLOOD PRESSURE: 154 MMHG | DIASTOLIC BLOOD PRESSURE: 77 MMHG | HEART RATE: 77 BPM | TEMPERATURE: 98.4 F

## 2019-12-01 VITALS
DIASTOLIC BLOOD PRESSURE: 84 MMHG | TEMPERATURE: 98.42 F | HEART RATE: 85 BPM | RESPIRATION RATE: 18 BRPM | OXYGEN SATURATION: 95 % | SYSTOLIC BLOOD PRESSURE: 160 MMHG

## 2019-12-01 VITALS — HEART RATE: 89 BPM

## 2019-12-01 VITALS — HEART RATE: 82 BPM | RESPIRATION RATE: 18 BRPM

## 2019-12-01 VITALS — OXYGEN SATURATION: 96 % | RESPIRATION RATE: 12 BRPM | HEART RATE: 63 BPM

## 2019-12-01 VITALS — HEART RATE: 70 BPM

## 2019-12-01 VITALS — HEART RATE: 80 BPM

## 2019-12-01 VITALS — HEART RATE: 77 BPM

## 2019-12-01 VITALS — OXYGEN SATURATION: 92 %

## 2019-12-01 VITALS — OXYGEN SATURATION: 94 %

## 2019-12-01 LAB
ANION GAP: 11 (ref 5–15)
BUN SERPL-MCNC: 73 MG/DL (ref 7–18)
BUN/CREAT RATIO: 8 RATIO (ref 10–20)
CALCIUM SERPL-MCNC: 8.9 MG/DL (ref 8.5–10.1)
CARBON DIOXIDE: 26 MMOL/L (ref 21–32)
CHLORIDE: 98 MMOL/L (ref 98–107)
DEPRECATED RDW RBC: 56.9 FL (ref 35.1–43.9)
ERYTHROCYTE [DISTWIDTH] IN BLOOD: 15.9 % (ref 11.6–14.6)
EST GLOM FILT RATE - AFR AMER: 6 ML/MIN (ref 60–?)
ESTIMATED CREATININE CLEARANCE: 5.87 ML/MIN
GLUCOSE: 84 MG/DL (ref 74–106)
HCT VFR BLD AUTO: 22.3 % (ref 37–47)
HEMOGLOBIN: 7.4 G/DL (ref 12–15)
HGB BLD-MCNC: 7.4 G/DL (ref 12–15)
IMMATURE GRANULOCYTES COUNT: 0.06 X10^3/UL (ref 0–0)
MCV RBC: 96.5 FL (ref 81–99)
MEAN CORP HGB CONC: 33.2 G/DL (ref 32–36)
MEAN PLATELET VOL.: 9.8 FL (ref 6.2–12)
NRBC FLAGGED BY ANALYZER: 0 % (ref 0–5)
PLATELET # BLD: 173 K/MM3 (ref 150–450)
PLATELET COUNT: 173 K/MM3 (ref 150–450)
POTASSIUM: 4.3 MMOL/L (ref 3.5–5.1)
RBC # BLD AUTO: 2.31 M/MM3 (ref 4.2–5.4)
RBC DISTRIBUTION WIDTH CV: 15.9 % (ref 11.6–14.6)
RBC DISTRIBUTION WIDTH SD: 56.9 FL (ref 35.1–43.9)
WBC # BLD AUTO: 6.5 K/MM3 (ref 4.4–11)
WHITE BLOOD COUNT: 6.5 K/MM3 (ref 4.4–11)

## 2019-12-01 RX ADMIN — SEVELAMER CARBONATE 2400 MG: 800 TABLET, FILM COATED ORAL at 15:53

## 2019-12-01 RX ADMIN — SALINE NASAL SPRAY 2 SPRAY: 1.5 SOLUTION NASAL at 11:59

## 2019-12-01 RX ADMIN — CEFAZOLIN SODIUM 100 GM: 1 INJECTION, SOLUTION INTRAVENOUS at 21:17

## 2019-12-01 RX ADMIN — SEVELAMER CARBONATE 2400 MG: 800 TABLET, FILM COATED ORAL at 09:00

## 2019-12-01 RX ADMIN — SEVELAMER CARBONATE 2400 MG: 800 TABLET, FILM COATED ORAL at 12:00

## 2019-12-01 RX ADMIN — SALINE NASAL SPRAY 2 SPRAY: 1.5 SOLUTION NASAL at 15:54

## 2019-12-01 RX ADMIN — NIFEDIPINE 90 MG: 90 TABLET, EXTENDED RELEASE ORAL at 09:02

## 2019-12-02 VITALS
DIASTOLIC BLOOD PRESSURE: 94 MMHG | SYSTOLIC BLOOD PRESSURE: 153 MMHG | OXYGEN SATURATION: 94 % | TEMPERATURE: 98.1 F | HEART RATE: 74 BPM | RESPIRATION RATE: 20 BRPM

## 2019-12-02 VITALS
TEMPERATURE: 98.78 F | DIASTOLIC BLOOD PRESSURE: 82 MMHG | HEART RATE: 68 BPM | RESPIRATION RATE: 16 BRPM | SYSTOLIC BLOOD PRESSURE: 155 MMHG

## 2019-12-02 VITALS
SYSTOLIC BLOOD PRESSURE: 149 MMHG | HEART RATE: 70 BPM | TEMPERATURE: 98.06 F | DIASTOLIC BLOOD PRESSURE: 81 MMHG | OXYGEN SATURATION: 98 % | RESPIRATION RATE: 18 BRPM

## 2019-12-02 VITALS — HEART RATE: 78 BPM

## 2019-12-02 VITALS
SYSTOLIC BLOOD PRESSURE: 147 MMHG | TEMPERATURE: 97.7 F | HEART RATE: 71 BPM | RESPIRATION RATE: 20 BRPM | OXYGEN SATURATION: 96 % | DIASTOLIC BLOOD PRESSURE: 83 MMHG

## 2019-12-02 VITALS — OXYGEN SATURATION: 96 %

## 2019-12-02 VITALS — HEART RATE: 76 BPM

## 2019-12-02 VITALS
RESPIRATION RATE: 20 BRPM | SYSTOLIC BLOOD PRESSURE: 149 MMHG | DIASTOLIC BLOOD PRESSURE: 78 MMHG | OXYGEN SATURATION: 94 % | TEMPERATURE: 98.9 F | HEART RATE: 81 BPM

## 2019-12-02 VITALS — RESPIRATION RATE: 12 BRPM

## 2019-12-02 VITALS — HEART RATE: 62 BPM | OXYGEN SATURATION: 96 % | RESPIRATION RATE: 18 BRPM

## 2019-12-02 VITALS — RESPIRATION RATE: 18 BRPM | HEART RATE: 76 BPM | OXYGEN SATURATION: 94 %

## 2019-12-02 VITALS — HEART RATE: 79 BPM

## 2019-12-02 VITALS — HEART RATE: 77 BPM

## 2019-12-02 VITALS
HEART RATE: 76 BPM | OXYGEN SATURATION: 94 % | SYSTOLIC BLOOD PRESSURE: 130 MMHG | RESPIRATION RATE: 16 BRPM | DIASTOLIC BLOOD PRESSURE: 83 MMHG | TEMPERATURE: 98.2 F

## 2019-12-02 VITALS — HEART RATE: 82 BPM | OXYGEN SATURATION: 95 % | RESPIRATION RATE: 18 BRPM

## 2019-12-02 VITALS — HEART RATE: 66 BPM | OXYGEN SATURATION: 97 %

## 2019-12-02 LAB
ANION GAP: 12 (ref 5–15)
BUN SERPL-MCNC: 93 MG/DL (ref 7–18)
BUN/CREAT RATIO: 8.8 RATIO (ref 10–20)
CALCIUM SERPL-MCNC: 9.1 MG/DL (ref 8.5–10.1)
CARBON DIOXIDE: 25 MMOL/L (ref 21–32)
CHLORIDE: 96 MMOL/L (ref 98–107)
DEPRECATED RDW RBC: 54.4 FL (ref 35.1–43.9)
ERYTHROCYTE [DISTWIDTH] IN BLOOD: 15.4 % (ref 11.6–14.6)
EST GLOM FILT RATE - AFR AMER: 5 ML/MIN (ref 60–?)
ESTIMATED CREATININE CLEARANCE: 5.02 ML/MIN
GLUCOSE: 84 MG/DL (ref 74–106)
HCT VFR BLD AUTO: 22.9 % (ref 37–47)
HEMOGLOBIN: 7.6 G/DL (ref 12–15)
HGB BLD-MCNC: 7.6 G/DL (ref 12–15)
IMMATURE GRANULOCYTES COUNT: 0.1 X10^3/UL (ref 0–0)
MCV RBC: 97 FL (ref 81–99)
MEAN CORP HGB CONC: 33.2 G/DL (ref 32–36)
MEAN PLATELET VOL.: 9.8 FL (ref 6.2–12)
NRBC FLAGGED BY ANALYZER: 0 % (ref 0–5)
PLATELET # BLD: 187 K/MM3 (ref 150–450)
PLATELET COUNT: 187 K/MM3 (ref 150–450)
POTASSIUM: 4.9 MMOL/L (ref 3.5–5.1)
RBC # BLD AUTO: 2.36 M/MM3 (ref 4.2–5.4)
RBC DISTRIBUTION WIDTH CV: 15.4 % (ref 11.6–14.6)
RBC DISTRIBUTION WIDTH SD: 54.4 FL (ref 35.1–43.9)
WBC # BLD AUTO: 6.1 K/MM3 (ref 4.4–11)
WHITE BLOOD COUNT: 6.1 K/MM3 (ref 4.4–11)

## 2019-12-02 RX ADMIN — RIZATRIPTAN BENZOATE 5 MG: 5 TABLET ORAL at 17:04

## 2019-12-02 RX ADMIN — SEVELAMER CARBONATE 2400 MG: 800 TABLET, FILM COATED ORAL at 17:01

## 2019-12-02 RX ADMIN — NIFEDIPINE 90 MG: 90 TABLET, EXTENDED RELEASE ORAL at 09:02

## 2019-12-02 RX ADMIN — CEFAZOLIN SODIUM 100 GM: 1 INJECTION, SOLUTION INTRAVENOUS at 21:47

## 2019-12-02 RX ADMIN — SEVELAMER CARBONATE 2400 MG: 800 TABLET, FILM COATED ORAL at 09:00

## 2019-12-02 RX ADMIN — SODIUM CHLORIDE, PRESERVATIVE FREE 0 ML: 5 INJECTION INTRAVENOUS at 21:47

## 2019-12-03 VITALS
DIASTOLIC BLOOD PRESSURE: 85 MMHG | TEMPERATURE: 98.96 F | RESPIRATION RATE: 18 BRPM | OXYGEN SATURATION: 93 % | SYSTOLIC BLOOD PRESSURE: 146 MMHG | HEART RATE: 95 BPM

## 2019-12-03 VITALS
RESPIRATION RATE: 16 BRPM | HEART RATE: 74 BPM | DIASTOLIC BLOOD PRESSURE: 86 MMHG | OXYGEN SATURATION: 98 % | TEMPERATURE: 97.3 F | SYSTOLIC BLOOD PRESSURE: 148 MMHG

## 2019-12-03 VITALS
DIASTOLIC BLOOD PRESSURE: 86 MMHG | RESPIRATION RATE: 17 BRPM | HEART RATE: 75 BPM | SYSTOLIC BLOOD PRESSURE: 164 MMHG | TEMPERATURE: 98.24 F | OXYGEN SATURATION: 94 %

## 2019-12-03 VITALS
DIASTOLIC BLOOD PRESSURE: 84 MMHG | OXYGEN SATURATION: 93 % | TEMPERATURE: 98.42 F | SYSTOLIC BLOOD PRESSURE: 157 MMHG | HEART RATE: 76 BPM | RESPIRATION RATE: 18 BRPM

## 2019-12-03 VITALS
RESPIRATION RATE: 18 BRPM | HEART RATE: 69 BPM | SYSTOLIC BLOOD PRESSURE: 147 MMHG | TEMPERATURE: 98.42 F | OXYGEN SATURATION: 96 % | DIASTOLIC BLOOD PRESSURE: 58 MMHG

## 2019-12-03 VITALS — HEART RATE: 75 BPM

## 2019-12-03 VITALS — HEART RATE: 71 BPM

## 2019-12-03 VITALS — HEART RATE: 96 BPM

## 2019-12-03 VITALS — HEART RATE: 84 BPM | SYSTOLIC BLOOD PRESSURE: 137 MMHG | DIASTOLIC BLOOD PRESSURE: 72 MMHG

## 2019-12-03 VITALS — HEART RATE: 74 BPM

## 2019-12-03 VITALS — OXYGEN SATURATION: 96 %

## 2019-12-03 VITALS — HEART RATE: 78 BPM

## 2019-12-03 VITALS — HEART RATE: 93 BPM

## 2019-12-03 LAB
ANION GAP: 10 (ref 5–15)
BUN SERPL-MCNC: 52 MG/DL (ref 7–18)
BUN/CREAT RATIO: 6.6 RATIO (ref 10–20)
CALCIUM SERPL-MCNC: 9.6 MG/DL (ref 8.5–10.1)
CARBON DIOXIDE: 29 MMOL/L (ref 21–32)
CHLORIDE: 96 MMOL/L (ref 98–107)
DEPRECATED RDW RBC: 54.4 FL (ref 35.1–43.9)
ERYTHROCYTE [DISTWIDTH] IN BLOOD: 15 % (ref 11.6–14.6)
EST GLOM FILT RATE - AFR AMER: 7 ML/MIN (ref 60–?)
ESTIMATED CREATININE CLEARANCE: 6.76 ML/MIN
GLUCOSE: 88 MG/DL (ref 74–106)
HCT VFR BLD AUTO: 27.6 % (ref 37–47)
HEMOGLOBIN: 9.2 G/DL (ref 12–15)
HGB BLD-MCNC: 9.2 G/DL (ref 12–15)
IMMATURE GRANULOCYTES COUNT: 0.28 X10^3/UL (ref 0–0)
MCV RBC: 97.2 FL (ref 81–99)
MEAN CORP HGB CONC: 33.3 G/DL (ref 32–36)
MEAN PLATELET VOL.: 9.6 FL (ref 6.2–12)
NRBC FLAGGED BY ANALYZER: 0 % (ref 0–5)
PLATELET # BLD: 264 K/MM3 (ref 150–450)
PLATELET COUNT: 264 K/MM3 (ref 150–450)
POTASSIUM: 4.7 MMOL/L (ref 3.5–5.1)
RBC # BLD AUTO: 2.84 M/MM3 (ref 4.2–5.4)
RBC DISTRIBUTION WIDTH CV: 15 % (ref 11.6–14.6)
RBC DISTRIBUTION WIDTH SD: 54.4 FL (ref 35.1–43.9)
WBC # BLD AUTO: 10.3 K/MM3 (ref 4.4–11)
WHITE BLOOD COUNT: 10.3 K/MM3 (ref 4.4–11)

## 2019-12-03 RX ADMIN — SEVELAMER CARBONATE 2400 MG: 800 TABLET, FILM COATED ORAL at 12:30

## 2019-12-03 RX ADMIN — CEFAZOLIN SODIUM 100 GM: 1 INJECTION, SOLUTION INTRAVENOUS at 20:12

## 2019-12-03 RX ADMIN — SODIUM CHLORIDE, PRESERVATIVE FREE 0 ML: 5 INJECTION INTRAVENOUS at 12:30

## 2019-12-03 RX ADMIN — SEVELAMER CARBONATE 2400 MG: 800 TABLET, FILM COATED ORAL at 17:36

## 2019-12-03 RX ADMIN — SODIUM CHLORIDE, PRESERVATIVE FREE 0 ML: 5 INJECTION INTRAVENOUS at 16:15

## 2019-12-03 RX ADMIN — NIFEDIPINE 90 MG: 90 TABLET, EXTENDED RELEASE ORAL at 10:03

## 2019-12-04 VITALS
RESPIRATION RATE: 16 BRPM | SYSTOLIC BLOOD PRESSURE: 179 MMHG | TEMPERATURE: 97.88 F | OXYGEN SATURATION: 92 % | DIASTOLIC BLOOD PRESSURE: 89 MMHG | HEART RATE: 80 BPM

## 2019-12-04 VITALS
TEMPERATURE: 97.88 F | SYSTOLIC BLOOD PRESSURE: 160 MMHG | RESPIRATION RATE: 16 BRPM | OXYGEN SATURATION: 93 % | HEART RATE: 72 BPM | DIASTOLIC BLOOD PRESSURE: 88 MMHG

## 2019-12-04 VITALS
SYSTOLIC BLOOD PRESSURE: 151 MMHG | RESPIRATION RATE: 18 BRPM | HEART RATE: 92 BPM | OXYGEN SATURATION: 92 % | TEMPERATURE: 98.42 F | DIASTOLIC BLOOD PRESSURE: 85 MMHG

## 2019-12-04 VITALS
HEART RATE: 71 BPM | TEMPERATURE: 98 F | SYSTOLIC BLOOD PRESSURE: 156 MMHG | OXYGEN SATURATION: 92 % | RESPIRATION RATE: 18 BRPM | DIASTOLIC BLOOD PRESSURE: 82 MMHG

## 2019-12-04 VITALS
HEART RATE: 72 BPM | RESPIRATION RATE: 20 BRPM | DIASTOLIC BLOOD PRESSURE: 88 MMHG | SYSTOLIC BLOOD PRESSURE: 160 MMHG | TEMPERATURE: 98.6 F

## 2019-12-04 VITALS — HEART RATE: 85 BPM

## 2019-12-04 VITALS — HEART RATE: 72 BPM | DIASTOLIC BLOOD PRESSURE: 88 MMHG | SYSTOLIC BLOOD PRESSURE: 160 MMHG

## 2019-12-04 VITALS — HEART RATE: 73 BPM

## 2019-12-04 VITALS — HEART RATE: 79 BPM

## 2019-12-04 LAB
ANION GAP: 10 (ref 5–15)
BUN SERPL-MCNC: 72 MG/DL (ref 7–18)
BUN/CREAT RATIO: 7.3 RATIO (ref 10–20)
CALCIUM SERPL-MCNC: 9.1 MG/DL (ref 8.5–10.1)
CARBON DIOXIDE: 27 MMOL/L (ref 21–32)
CHLORIDE: 97 MMOL/L (ref 98–107)
EST GLOM FILT RATE - AFR AMER: 5 ML/MIN (ref 60–?)
ESTIMATED CREATININE CLEARANCE: 5.4 ML/MIN
GLUCOSE: 87 MG/DL (ref 74–106)
HCT VFR BLD AUTO: 22.8 % (ref 37–47)
HEMOGLOBIN: 7.6 G/DL (ref 12–15)
HGB BLD-MCNC: 7.6 G/DL (ref 12–15)
POTASSIUM: 4.6 MMOL/L (ref 3.5–5.1)

## 2019-12-04 RX ADMIN — SODIUM CHLORIDE, PRESERVATIVE FREE 0 ML: 5 INJECTION INTRAVENOUS at 08:57

## 2019-12-04 RX ADMIN — NIFEDIPINE 90 MG: 90 TABLET, EXTENDED RELEASE ORAL at 17:52

## 2019-12-04 RX ADMIN — SEVELAMER CARBONATE 2400 MG: 800 TABLET, FILM COATED ORAL at 10:22

## 2019-12-04 RX ADMIN — SEVELAMER CARBONATE 2400 MG: 800 TABLET, FILM COATED ORAL at 17:49

## 2020-01-08 ENCOUNTER — HOSPITAL ENCOUNTER (OUTPATIENT)
Age: 50
End: 2020-01-08
Payer: COMMERCIAL

## 2020-01-08 VITALS — BODY MASS INDEX: 42.3 KG/M2

## 2020-01-08 DIAGNOSIS — G47.33: Primary | ICD-10-CM

## 2020-01-08 PROCEDURE — 95811 POLYSOM 6/>YRS CPAP 4/> PARM: CPT

## 2020-01-23 ENCOUNTER — HOSPITAL ENCOUNTER (OUTPATIENT)
Age: 50
End: 2020-01-23
Payer: COMMERCIAL

## 2020-01-23 VITALS — BODY MASS INDEX: 42.3 KG/M2 | BODY MASS INDEX: 40.8 KG/M2

## 2020-01-23 DIAGNOSIS — Z12.31: Primary | ICD-10-CM

## 2020-01-23 PROCEDURE — 77063 BREAST TOMOSYNTHESIS BI: CPT

## 2020-01-23 PROCEDURE — 77067 SCR MAMMO BI INCL CAD: CPT

## 2022-06-08 ENCOUNTER — HOSPITAL ENCOUNTER (EMERGENCY)
Age: 52
Discharge: ANOTHER HOSPITAL | End: 2022-06-08
Payer: MEDICARE

## 2022-06-08 VITALS
HEART RATE: 82 BPM | OXYGEN SATURATION: 100 % | TEMPERATURE: 97.3 F | RESPIRATION RATE: 16 BRPM | DIASTOLIC BLOOD PRESSURE: 82 MMHG | SYSTOLIC BLOOD PRESSURE: 144 MMHG

## 2022-06-08 VITALS — BODY MASS INDEX: 47.5 KG/M2

## 2022-06-08 VITALS
SYSTOLIC BLOOD PRESSURE: 139 MMHG | TEMPERATURE: 98.78 F | RESPIRATION RATE: 16 BRPM | OXYGEN SATURATION: 91 % | HEART RATE: 79 BPM | DIASTOLIC BLOOD PRESSURE: 80 MMHG

## 2022-06-08 VITALS
RESPIRATION RATE: 17 BRPM | SYSTOLIC BLOOD PRESSURE: 139 MMHG | HEART RATE: 89 BPM | OXYGEN SATURATION: 96 % | DIASTOLIC BLOOD PRESSURE: 88 MMHG

## 2022-06-08 VITALS
HEART RATE: 82 BPM | SYSTOLIC BLOOD PRESSURE: 149 MMHG | DIASTOLIC BLOOD PRESSURE: 81 MMHG | OXYGEN SATURATION: 100 % | TEMPERATURE: 97.6 F | RESPIRATION RATE: 17 BRPM

## 2022-06-08 VITALS
HEART RATE: 85 BPM | OXYGEN SATURATION: 97 % | RESPIRATION RATE: 18 BRPM | SYSTOLIC BLOOD PRESSURE: 125 MMHG | TEMPERATURE: 97.16 F | DIASTOLIC BLOOD PRESSURE: 72 MMHG

## 2022-06-08 VITALS
SYSTOLIC BLOOD PRESSURE: 144 MMHG | OXYGEN SATURATION: 100 % | RESPIRATION RATE: 16 BRPM | HEART RATE: 75 BPM | TEMPERATURE: 97.88 F | DIASTOLIC BLOOD PRESSURE: 84 MMHG

## 2022-06-08 VITALS
TEMPERATURE: 96.98 F | RESPIRATION RATE: 17 BRPM | OXYGEN SATURATION: 100 % | HEART RATE: 85 BPM | SYSTOLIC BLOOD PRESSURE: 145 MMHG | DIASTOLIC BLOOD PRESSURE: 79 MMHG

## 2022-06-08 VITALS
DIASTOLIC BLOOD PRESSURE: 78 MMHG | OXYGEN SATURATION: 100 % | RESPIRATION RATE: 16 BRPM | HEART RATE: 79 BPM | SYSTOLIC BLOOD PRESSURE: 138 MMHG

## 2022-06-08 VITALS
OXYGEN SATURATION: 98 % | HEART RATE: 62 BPM | SYSTOLIC BLOOD PRESSURE: 138 MMHG | RESPIRATION RATE: 15 BRPM | DIASTOLIC BLOOD PRESSURE: 77 MMHG

## 2022-06-08 DIAGNOSIS — Z98.84: ICD-10-CM

## 2022-06-08 DIAGNOSIS — F32.A: ICD-10-CM

## 2022-06-08 DIAGNOSIS — Z79.899: ICD-10-CM

## 2022-06-08 DIAGNOSIS — F41.9: ICD-10-CM

## 2022-06-08 DIAGNOSIS — E87.6: ICD-10-CM

## 2022-06-08 DIAGNOSIS — D64.9: ICD-10-CM

## 2022-06-08 DIAGNOSIS — Z87.891: ICD-10-CM

## 2022-06-08 DIAGNOSIS — N18.6: ICD-10-CM

## 2022-06-08 DIAGNOSIS — R19.5: Primary | ICD-10-CM

## 2022-06-08 LAB
ALBUMIN SERPL-MCNC: 2.9 G/DL (ref 3.2–5)
ANISOCYTOSIS BLD QL SMEAR: (no result)
ANISOCYTOSIS: (no result)
BUN SERPL-MCNC: 44 MG/DL (ref 7–18)
BUN/CREAT RATIO: 6.2 RATIO (ref 10–20)
CALCIUM SERPL-MCNC: 8.2 MG/DL (ref 8.5–10.1)
CARBON DIOXIDE: 29 MMOL/L (ref 21–32)
CHLORIDE: 96 MMOL/L (ref 98–107)
DEPRECATED RDW RBC: 94.6 FL (ref 35.1–43.9)
DIFFERENTIAL INDICATED: (no result)
ERYTHROCYTE [DISTWIDTH] IN BLOOD: 22.5 % (ref 11.6–14.6)
EST GLOM FILT RATE - AFR AMER: 8 ML/MIN (ref 60–?)
ESTIMATED CREATININE CLEARANCE: 7.31 ML/MIN
GLUCOSE: 101 MG/DL (ref 74–106)
HCT VFR BLD AUTO: 17.2 % (ref 37–47)
HEMOGLOBIN: 5.2 G/DL (ref 12–15)
HGB BLD-MCNC: 5.2 G/DL (ref 12–15)
HYPOCHROMASIA: (no result)
HYPOCHROMIA BLD QL: (no result)
IMMATURE GRANULOCYTES COUNT: 0.33 X10^3/UL (ref 0–0)
MACROCYTES BLD QL: (no result)
MACROCYTOSIS: (no result)
MCV RBC: 119.4 FL (ref 81–99)
MEAN CORP HGB CONC: 30.2 G/DL (ref 32–36)
MEAN PLATELET VOL.: 8.5 FL (ref 6.2–12)
NRBC FLAGGED BY ANALYZER: 0.2 % (ref 0–5)
PLATELET # BLD: 330 K/MM3 (ref 150–450)
PLATELET COUNT: 330 K/MM3 (ref 150–450)
POLYCHROMASIA BLD QL SMEAR: (no result)
POSITIVE COUNT: YES
POSITIVE MORPHOLOGY: YES
POTASSIUM: 2.9 MMOL/L (ref 3.5–5.1)
RBC # BLD AUTO: 1.44 M/MM3 (ref 4.2–5.4)
RBC DISTRIBUTION WIDTH CV: 22.5 % (ref 11.6–14.6)
RBC DISTRIBUTION WIDTH SD: 94.6 FL (ref 35.1–43.9)
SCAN SMEAR PER REVIEW CRITERIA: (no result)
WBC # BLD AUTO: 8.7 K/MM3 (ref 4.4–11)
WHITE BLOOD COUNT: 8.7 K/MM3 (ref 4.4–11)

## 2022-06-08 PROCEDURE — P9016 RBC LEUKOCYTES REDUCED: HCPCS

## 2022-06-08 PROCEDURE — 86850 RBC ANTIBODY SCREEN: CPT

## 2022-06-08 PROCEDURE — 86900 BLOOD TYPING SEROLOGIC ABO: CPT

## 2022-06-08 PROCEDURE — 80069 RENAL FUNCTION PANEL: CPT

## 2022-06-08 PROCEDURE — 82274 ASSAY TEST FOR BLOOD FECAL: CPT

## 2022-06-08 PROCEDURE — 36430 TRANSFUSION BLD/BLD COMPNT: CPT

## 2022-06-08 PROCEDURE — 86922 COMPATIBILITY TEST ANTIGLOB: CPT

## 2022-06-08 PROCEDURE — 86901 BLOOD TYPING SEROLOGIC RH(D): CPT

## 2022-06-08 PROCEDURE — 93005 ELECTROCARDIOGRAM TRACING: CPT

## 2022-06-08 PROCEDURE — 86920 COMPATIBILITY TEST SPIN: CPT

## 2022-06-08 PROCEDURE — 85025 COMPLETE CBC W/AUTO DIFF WBC: CPT

## 2022-06-08 PROCEDURE — 99285 EMERGENCY DEPT VISIT HI MDM: CPT

## 2023-02-23 ENCOUNTER — HOSPITAL ENCOUNTER (INPATIENT)
Dept: HOSPITAL 100 - ED | Age: 53
LOS: 2 days | Discharge: HOME | DRG: 193 | End: 2023-02-25
Payer: MEDICARE

## 2023-02-23 VITALS
OXYGEN SATURATION: 95 % | TEMPERATURE: 99.68 F | DIASTOLIC BLOOD PRESSURE: 94 MMHG | HEART RATE: 82 BPM | RESPIRATION RATE: 18 BRPM | SYSTOLIC BLOOD PRESSURE: 155 MMHG

## 2023-02-23 VITALS — BODY MASS INDEX: 38.2 KG/M2 | BODY MASS INDEX: 39.2 KG/M2 | BODY MASS INDEX: 39.8 KG/M2

## 2023-02-23 VITALS — OXYGEN SATURATION: 89 %

## 2023-02-23 VITALS
OXYGEN SATURATION: 96 % | RESPIRATION RATE: 18 BRPM | SYSTOLIC BLOOD PRESSURE: 143 MMHG | TEMPERATURE: 101.3 F | HEART RATE: 91 BPM | DIASTOLIC BLOOD PRESSURE: 75 MMHG

## 2023-02-23 VITALS
HEART RATE: 88 BPM | RESPIRATION RATE: 18 BRPM | SYSTOLIC BLOOD PRESSURE: 130 MMHG | TEMPERATURE: 96.98 F | DIASTOLIC BLOOD PRESSURE: 71 MMHG | OXYGEN SATURATION: 97 %

## 2023-02-23 VITALS
DIASTOLIC BLOOD PRESSURE: 100 MMHG | SYSTOLIC BLOOD PRESSURE: 172 MMHG | RESPIRATION RATE: 16 BRPM | HEART RATE: 93 BPM | TEMPERATURE: 98.6 F | OXYGEN SATURATION: 95 %

## 2023-02-23 VITALS
DIASTOLIC BLOOD PRESSURE: 79 MMHG | SYSTOLIC BLOOD PRESSURE: 141 MMHG | HEART RATE: 96 BPM | OXYGEN SATURATION: 94 % | TEMPERATURE: 98.1 F | RESPIRATION RATE: 18 BRPM

## 2023-02-23 VITALS
HEART RATE: 94 BPM | SYSTOLIC BLOOD PRESSURE: 143 MMHG | OXYGEN SATURATION: 95 % | DIASTOLIC BLOOD PRESSURE: 79 MMHG | RESPIRATION RATE: 26 BRPM

## 2023-02-23 VITALS — OXYGEN SATURATION: 95 %

## 2023-02-23 VITALS
OXYGEN SATURATION: 85 % | SYSTOLIC BLOOD PRESSURE: 173 MMHG | RESPIRATION RATE: 22 BRPM | DIASTOLIC BLOOD PRESSURE: 83 MMHG | TEMPERATURE: 100.5 F | HEART RATE: 100 BPM

## 2023-02-23 VITALS — OXYGEN SATURATION: 96 %

## 2023-02-23 VITALS
DIASTOLIC BLOOD PRESSURE: 69 MMHG | TEMPERATURE: 99.7 F | SYSTOLIC BLOOD PRESSURE: 113 MMHG | RESPIRATION RATE: 18 BRPM | OXYGEN SATURATION: 94 % | HEART RATE: 83 BPM

## 2023-02-23 VITALS — RESPIRATION RATE: 18 BRPM | HEART RATE: 98 BPM

## 2023-02-23 VITALS — OXYGEN SATURATION: 95 % | RESPIRATION RATE: 23 BRPM

## 2023-02-23 VITALS — OXYGEN SATURATION: 91 %

## 2023-02-23 DIAGNOSIS — F32.A: ICD-10-CM

## 2023-02-23 DIAGNOSIS — Z79.899: ICD-10-CM

## 2023-02-23 DIAGNOSIS — R09.02: ICD-10-CM

## 2023-02-23 DIAGNOSIS — E78.5: ICD-10-CM

## 2023-02-23 DIAGNOSIS — Z99.2: ICD-10-CM

## 2023-02-23 DIAGNOSIS — I10: ICD-10-CM

## 2023-02-23 DIAGNOSIS — F41.9: ICD-10-CM

## 2023-02-23 DIAGNOSIS — D63.1: ICD-10-CM

## 2023-02-23 DIAGNOSIS — J13: Primary | ICD-10-CM

## 2023-02-23 DIAGNOSIS — K21.9: ICD-10-CM

## 2023-02-23 DIAGNOSIS — Z20.822: ICD-10-CM

## 2023-02-23 DIAGNOSIS — Z87.891: ICD-10-CM

## 2023-02-23 DIAGNOSIS — J40: ICD-10-CM

## 2023-02-23 DIAGNOSIS — Z94.84: ICD-10-CM

## 2023-02-23 DIAGNOSIS — N18.6: ICD-10-CM

## 2023-02-23 DIAGNOSIS — E66.2: ICD-10-CM

## 2023-02-23 DIAGNOSIS — Z86.2: ICD-10-CM

## 2023-02-23 DIAGNOSIS — E03.9: ICD-10-CM

## 2023-02-23 DIAGNOSIS — B97.81: ICD-10-CM

## 2023-02-23 LAB
ALANINE AMINOTRANSFER ALT/SGPT: 15 U/L (ref 13–56)
ALBUMIN SERPL-MCNC: 2.8 G/DL (ref 3.2–5)
ALKALINE PHOSPHATASE: 147 U/L (ref 45–117)
ANION GAP: 7 (ref 5–15)
ANION GAP: 9 (ref 5–15)
AST(SGOT): 16 U/L (ref 15–37)
BUN SERPL-MCNC: 27 MG/DL (ref 7–18)
BUN SERPL-MCNC: 28 MG/DL (ref 7–18)
BUN/CREAT RATIO: 4.9 RATIO (ref 10–20)
BUN/CREAT RATIO: 4.9 RATIO (ref 10–20)
CALCIUM SERPL-MCNC: 8.9 MG/DL (ref 8.5–10.1)
CALCIUM SERPL-MCNC: 9.1 MG/DL (ref 8.5–10.1)
CARBON DIOXIDE: 32 MMOL/L (ref 21–32)
CARBON DIOXIDE: 35 MMOL/L (ref 21–32)
CHLORIDE: 92 MMOL/L (ref 98–107)
CHLORIDE: 93 MMOL/L (ref 98–107)
DEPRECATED RDW RBC: 57.1 FL (ref 35.1–43.9)
DEPRECATED RDW RBC: 57.5 FL (ref 35.1–43.9)
ERYTHROCYTE [DISTWIDTH] IN BLOOD: 15.1 % (ref 11.6–14.6)
ERYTHROCYTE [DISTWIDTH] IN BLOOD: 15.2 % (ref 11.6–14.6)
EST GLOM FILT RATE - AFR AMER: 10 ML/MIN (ref 60–?)
EST GLOM FILT RATE - AFR AMER: 11 ML/MIN (ref 60–?)
ESTIMATED CREATININE CLEARANCE: 9.09 ML/MIN
ESTIMATED CREATININE CLEARANCE: 9.41 ML/MIN
GLOBULIN: 4.2 G/DL (ref 2.2–4.2)
GLUCOSE: 115 MG/DL (ref 74–106)
GLUCOSE: 127 MG/DL (ref 74–106)
HCT VFR BLD AUTO: 30.6 % (ref 37–47)
HCT VFR BLD AUTO: 30.8 % (ref 37–47)
HEMOGLOBIN: 9.6 G/DL (ref 12–15)
HEMOGLOBIN: 9.6 G/DL (ref 12–15)
HGB BLD-MCNC: 9.6 G/DL (ref 12–15)
HGB BLD-MCNC: 9.6 G/DL (ref 12–15)
IMMATURE GRANULOCYTES COUNT: 0.04 X10^3/UL (ref 0–0)
IMMATURE GRANULOCYTES COUNT: 0.05 X10^3/UL (ref 0–0)
MAGNESIUM: 2 MG/DL (ref 1.6–2.6)
MCV RBC: 101.7 FL (ref 81–99)
MCV RBC: 102 FL (ref 81–99)
MEAN CORP HGB CONC: 31.2 G/DL (ref 32–36)
MEAN CORP HGB CONC: 31.4 G/DL (ref 32–36)
MEAN PLATELET VOL.: 8.8 FL (ref 6.2–12)
MEAN PLATELET VOL.: 9.3 FL (ref 6.2–12)
NRBC FLAGGED BY ANALYZER: 0 % (ref 0–5)
NRBC FLAGGED BY ANALYZER: 0.8 % (ref 0–5)
PLATELET # BLD: 199 K/MM3 (ref 150–450)
PLATELET # BLD: 204 K/MM3 (ref 150–450)
PLATELET COUNT: 199 K/MM3 (ref 150–450)
PLATELET COUNT: 204 K/MM3 (ref 150–450)
POTASSIUM: 3.7 MMOL/L (ref 3.5–5.1)
POTASSIUM: 4 MMOL/L (ref 3.5–5.1)
RBC # BLD AUTO: 3.01 M/MM3 (ref 4.2–5.4)
RBC # BLD AUTO: 3.02 M/MM3 (ref 4.2–5.4)
RBC DISTRIBUTION WIDTH CV: 15.1 % (ref 11.6–14.6)
RBC DISTRIBUTION WIDTH CV: 15.2 % (ref 11.6–14.6)
RBC DISTRIBUTION WIDTH SD: 57.1 FL (ref 35.1–43.9)
RBC DISTRIBUTION WIDTH SD: 57.5 FL (ref 35.1–43.9)
WBC # BLD AUTO: 5.1 K/MM3 (ref 4.4–11)
WBC # BLD AUTO: 5.9 K/MM3 (ref 4.4–11)
WHITE BLOOD COUNT: 5.1 K/MM3 (ref 4.4–11)
WHITE BLOOD COUNT: 5.9 K/MM3 (ref 4.4–11)

## 2023-02-23 PROCEDURE — G0257 UNSCHED DIALYSIS ESRD PT HOS: HCPCS

## 2023-02-23 PROCEDURE — 87077 CULTURE AEROBIC IDENTIFY: CPT

## 2023-02-23 PROCEDURE — G0463 HOSPITAL OUTPT CLINIC VISIT: HCPCS

## 2023-02-23 PROCEDURE — 85025 COMPLETE CBC W/AUTO DIFF WBC: CPT

## 2023-02-23 PROCEDURE — 87428 SARSCOV & INF VIR A&B AG IA: CPT

## 2023-02-23 PROCEDURE — 83735 ASSAY OF MAGNESIUM: CPT

## 2023-02-23 PROCEDURE — 94668 MNPJ CHEST WALL SBSQ: CPT

## 2023-02-23 PROCEDURE — 94660 CPAP INITIATION&MGMT: CPT

## 2023-02-23 PROCEDURE — 87205 SMEAR GRAM STAIN: CPT

## 2023-02-23 PROCEDURE — 99252 IP/OBS CONSLTJ NEW/EST SF 35: CPT

## 2023-02-23 PROCEDURE — 99284 EMERGENCY DEPT VISIT MOD MDM: CPT

## 2023-02-23 PROCEDURE — 80053 COMPREHEN METABOLIC PANEL: CPT

## 2023-02-23 PROCEDURE — 80048 BASIC METABOLIC PNL TOTAL CA: CPT

## 2023-02-23 PROCEDURE — 83605 ASSAY OF LACTIC ACID: CPT

## 2023-02-23 PROCEDURE — A4216 STERILE WATER/SALINE, 10 ML: HCPCS

## 2023-02-23 PROCEDURE — 94640 AIRWAY INHALATION TREATMENT: CPT

## 2023-02-23 PROCEDURE — 71045 X-RAY EXAM CHEST 1 VIEW: CPT

## 2023-02-23 PROCEDURE — 87633 RESP VIRUS 12-25 TARGETS: CPT

## 2023-02-23 PROCEDURE — 87641 MR-STAPH DNA AMP PROBE: CPT

## 2023-02-23 PROCEDURE — 87040 BLOOD CULTURE FOR BACTERIA: CPT

## 2023-02-23 PROCEDURE — 87070 CULTURE OTHR SPECIMN AEROBIC: CPT

## 2023-02-23 PROCEDURE — 36415 COLL VENOUS BLD VENIPUNCTURE: CPT

## 2023-02-23 PROCEDURE — 84100 ASSAY OF PHOSPHORUS: CPT

## 2023-02-23 PROCEDURE — 90937 HEMODIALYSIS REPEATED EVAL: CPT

## 2023-02-23 RX ADMIN — ALBUTEROL SULFATE 2.5 MG: 2.5 SOLUTION RESPIRATORY (INHALATION) at 14:15

## 2023-02-23 RX ADMIN — SODIUM CHLORIDE, PRESERVATIVE FREE 0 ML: 5 INJECTION INTRAVENOUS at 12:23

## 2023-02-23 RX ADMIN — GUAIFENESIN 20 ML: 200 SOLUTION ORAL at 20:06

## 2023-02-23 RX ADMIN — Medication 5 MG: at 21:19

## 2023-02-23 RX ADMIN — SODIUM CHLORIDE 100 ML: 9 INJECTION, SOLUTION INTRAVENOUS at 04:36

## 2023-02-23 RX ADMIN — HEPARIN SODIUM 5000 UNIT: 5000 INJECTION, SOLUTION INTRAVENOUS; SUBCUTANEOUS at 09:28

## 2023-02-23 RX ADMIN — SODIUM CHLORIDE, PRESERVATIVE FREE 0 ML: 5 INJECTION INTRAVENOUS at 04:54

## 2023-02-23 RX ADMIN — GUAIFENESIN 20 ML: 200 SOLUTION ORAL at 12:23

## 2023-02-23 RX ADMIN — GUAIFENESIN 20 ML: 200 SOLUTION ORAL at 04:49

## 2023-02-23 RX ADMIN — HEPARIN SODIUM 5000 UNIT: 5000 INJECTION, SOLUTION INTRAVENOUS; SUBCUTANEOUS at 21:19

## 2023-02-24 VITALS — RESPIRATION RATE: 25 BRPM | HEART RATE: 77 BPM

## 2023-02-24 VITALS — HEART RATE: 83 BPM | RESPIRATION RATE: 18 BRPM

## 2023-02-24 VITALS
OXYGEN SATURATION: 93 % | HEART RATE: 82 BPM | SYSTOLIC BLOOD PRESSURE: 152 MMHG | TEMPERATURE: 98.96 F | DIASTOLIC BLOOD PRESSURE: 83 MMHG | RESPIRATION RATE: 18 BRPM

## 2023-02-24 VITALS
OXYGEN SATURATION: 96 % | TEMPERATURE: 98.96 F | HEART RATE: 86 BPM | DIASTOLIC BLOOD PRESSURE: 78 MMHG | RESPIRATION RATE: 18 BRPM | SYSTOLIC BLOOD PRESSURE: 136 MMHG

## 2023-02-24 VITALS
DIASTOLIC BLOOD PRESSURE: 81 MMHG | HEART RATE: 90 BPM | RESPIRATION RATE: 20 BRPM | OXYGEN SATURATION: 95 % | TEMPERATURE: 98.9 F | SYSTOLIC BLOOD PRESSURE: 151 MMHG

## 2023-02-24 VITALS
TEMPERATURE: 99.4 F | SYSTOLIC BLOOD PRESSURE: 151 MMHG | RESPIRATION RATE: 18 BRPM | HEART RATE: 76 BPM | DIASTOLIC BLOOD PRESSURE: 84 MMHG | OXYGEN SATURATION: 96 %

## 2023-02-24 VITALS — HEART RATE: 80 BPM | RESPIRATION RATE: 20 BRPM

## 2023-02-24 VITALS
SYSTOLIC BLOOD PRESSURE: 151 MMHG | HEART RATE: 79 BPM | RESPIRATION RATE: 18 BRPM | TEMPERATURE: 99.32 F | DIASTOLIC BLOOD PRESSURE: 92 MMHG | OXYGEN SATURATION: 93 %

## 2023-02-24 VITALS — HEART RATE: 97 BPM | OXYGEN SATURATION: 94 % | RESPIRATION RATE: 36 BRPM

## 2023-02-24 VITALS — RESPIRATION RATE: 24 BRPM | HEART RATE: 88 BPM

## 2023-02-24 LAB
ANION GAP: 12 (ref 5–15)
BUN SERPL-MCNC: 44 MG/DL (ref 7–18)
BUN/CREAT RATIO: 5.6 RATIO (ref 10–20)
CALCIUM SERPL-MCNC: 8.9 MG/DL (ref 8.5–10.1)
CARBON DIOXIDE: 29 MMOL/L (ref 21–32)
CHLORIDE: 93 MMOL/L (ref 98–107)
DEPRECATED RDW RBC: 54.6 FL (ref 35.1–43.9)
ERYTHROCYTE [DISTWIDTH] IN BLOOD: 14.7 % (ref 11.6–14.6)
EST GLOM FILT RATE - AFR AMER: 7 ML/MIN (ref 60–?)
ESTIMATED CREATININE CLEARANCE: 6.58 ML/MIN
GLUCOSE: 104 MG/DL (ref 74–106)
HCT VFR BLD AUTO: 34.4 % (ref 37–47)
HEMOGLOBIN: 10.7 G/DL (ref 12–15)
HGB BLD-MCNC: 10.7 G/DL (ref 12–15)
IMMATURE GRANULOCYTES COUNT: 0.05 X10^3/UL (ref 0–0)
MCV RBC: 100.9 FL (ref 81–99)
MEAN CORP HGB CONC: 31.1 G/DL (ref 32–36)
MEAN PLATELET VOL.: 9.1 FL (ref 6.2–12)
NRBC FLAGGED BY ANALYZER: 0 % (ref 0–5)
PLATELET # BLD: 222 K/MM3 (ref 150–450)
PLATELET COUNT: 222 K/MM3 (ref 150–450)
POTASSIUM: 4.2 MMOL/L (ref 3.5–5.1)
RBC # BLD AUTO: 3.41 M/MM3 (ref 4.2–5.4)
RBC DISTRIBUTION WIDTH CV: 14.7 % (ref 11.6–14.6)
RBC DISTRIBUTION WIDTH SD: 54.6 FL (ref 35.1–43.9)
WBC # BLD AUTO: 5.7 K/MM3 (ref 4.4–11)
WHITE BLOOD COUNT: 5.7 K/MM3 (ref 4.4–11)

## 2023-02-24 RX ADMIN — GUAIFENESIN 20 ML: 200 SOLUTION ORAL at 16:57

## 2023-02-24 RX ADMIN — SODIUM CHLORIDE, PRESERVATIVE FREE 0 ML: 5 INJECTION INTRAVENOUS at 21:31

## 2023-02-24 RX ADMIN — HEPARIN SODIUM 5000 UNIT: 5000 INJECTION, SOLUTION INTRAVENOUS; SUBCUTANEOUS at 09:27

## 2023-02-24 RX ADMIN — ALBUTEROL SULFATE 2.5 MG: 2.5 SOLUTION RESPIRATORY (INHALATION) at 21:09

## 2023-02-24 RX ADMIN — ALBUTEROL SULFATE 2.5 MG: 2.5 SOLUTION RESPIRATORY (INHALATION) at 18:06

## 2023-02-24 RX ADMIN — GUAIFENESIN 20 ML: 200 SOLUTION ORAL at 04:04

## 2023-02-24 RX ADMIN — ALBUTEROL SULFATE 2.5 MG: 2.5 SOLUTION RESPIRATORY (INHALATION) at 12:15

## 2023-02-24 RX ADMIN — HEPARIN SODIUM 5000 UNIT: 5000 INJECTION, SOLUTION INTRAVENOUS; SUBCUTANEOUS at 21:31

## 2023-02-24 RX ADMIN — GUAIFENESIN 20 ML: 200 SOLUTION ORAL at 21:30

## 2023-02-24 RX ADMIN — Medication 5 MG: at 21:30

## 2023-02-24 RX ADMIN — ALBUTEROL SULFATE 2.5 MG: 2.5 SOLUTION RESPIRATORY (INHALATION) at 04:18

## 2023-02-25 VITALS
DIASTOLIC BLOOD PRESSURE: 89 MMHG | TEMPERATURE: 99.1 F | RESPIRATION RATE: 18 BRPM | SYSTOLIC BLOOD PRESSURE: 149 MMHG | OXYGEN SATURATION: 91 % | HEART RATE: 81 BPM

## 2023-02-25 VITALS
SYSTOLIC BLOOD PRESSURE: 167 MMHG | HEART RATE: 95 BPM | DIASTOLIC BLOOD PRESSURE: 100 MMHG | RESPIRATION RATE: 18 BRPM | OXYGEN SATURATION: 97 % | TEMPERATURE: 99.9 F

## 2023-02-25 VITALS
HEART RATE: 87 BPM | SYSTOLIC BLOOD PRESSURE: 130 MMHG | OXYGEN SATURATION: 91 % | RESPIRATION RATE: 18 BRPM | TEMPERATURE: 99.1 F | DIASTOLIC BLOOD PRESSURE: 81 MMHG

## 2023-02-25 VITALS — OXYGEN SATURATION: 96 %

## 2023-02-25 VITALS — OXYGEN SATURATION: 88 %

## 2023-02-25 VITALS — OXYGEN SATURATION: 95 %

## 2023-02-25 VITALS — HEART RATE: 95 BPM

## 2023-02-25 RX ADMIN — SODIUM CHLORIDE, PRESERVATIVE FREE 0 ML: 5 INJECTION INTRAVENOUS at 01:40

## 2023-02-25 RX ADMIN — SODIUM CHLORIDE, PRESERVATIVE FREE 0 ML: 5 INJECTION INTRAVENOUS at 12:05

## 2023-02-25 RX ADMIN — Medication 1 LOZENGE: at 05:43

## 2023-02-25 RX ADMIN — GUAIFENESIN 20 ML: 200 SOLUTION ORAL at 01:29

## 2023-02-25 RX ADMIN — SODIUM CHLORIDE, PRESERVATIVE FREE 0 ML: 5 INJECTION INTRAVENOUS at 05:45

## 2023-02-25 RX ADMIN — HEPARIN SODIUM 5000 UNIT: 5000 INJECTION, SOLUTION INTRAVENOUS; SUBCUTANEOUS at 09:48

## 2023-02-25 RX ADMIN — Medication 1 LOZENGE: at 01:40

## 2023-11-14 ENCOUNTER — TELEPHONE (OUTPATIENT)
Dept: TRANSPLANT | Facility: HOSPITAL | Age: 53
End: 2023-11-14
Payer: MEDICARE

## 2023-11-20 ENCOUNTER — DOCUMENTATION (OUTPATIENT)
Dept: TRANSPLANT | Facility: HOSPITAL | Age: 53
End: 2023-11-20
Payer: MEDICARE

## 2023-11-20 ENCOUNTER — TELEPHONE (OUTPATIENT)
Dept: TRANSPLANT | Facility: HOSPITAL | Age: 53
End: 2023-11-20

## 2023-11-20 VITALS — BODY MASS INDEX: 34.28 KG/M2 | HEIGHT: 62 IN | WEIGHT: 186.29 LBS

## 2023-11-20 NOTE — TELEPHONE ENCOUNTER
Do you have difficulty reading or writing in English?   no   What is the primary cause of your kidney disease? Amyloidosis  Are you currently on dialysis?   yes  If yes, what days do you have your dialysis treatments?   Mon, Wed, Fri.  Have you received a transplant before? Yes.  If yes, what organ, and when and where was your transplant? Stem Cell [facility].  Have you been diagnosed with diabetes?    no  Have you tested positive for hepatitis or HIV?   no  Have you ever been diagnosed with cancer?   no  If yes, what type of cancer, and when and where were you treated?   N/A  Do you have a history of a heart attack or stroke? No  Are you currently or have you previously been seen by a mental health professional? yes  If yes, what is the name of your mental health provider? Part of the referral of [facility].  Are you a current or former tobacco user?   no  Do you have history of alcohol abuse or dependence?   no  Do you have a history of illegal drug abuse or dependence?   no  Has anyone told you they're willing to donate their kidney to you?   yes  Comments: Intake completed. Pt will reach out to give insurance information to be scheduled for virtual education and financial review.

## 2023-12-01 ENCOUNTER — DOCUMENTATION (OUTPATIENT)
Dept: TRANSPLANT | Facility: HOSPITAL | Age: 53
End: 2023-12-01
Payer: MEDICARE

## 2023-12-01 DIAGNOSIS — N18.6 ESRD (END STAGE RENAL DISEASE) (MULTI): Primary | ICD-10-CM

## 2024-01-04 ENCOUNTER — TELEPHONE (OUTPATIENT)
Dept: TRANSPLANT | Facility: HOSPITAL | Age: 54
End: 2024-01-04
Payer: MEDICARE

## 2024-01-04 ENCOUNTER — DOCUMENTATION (OUTPATIENT)
Dept: TRANSPLANT | Facility: HOSPITAL | Age: 54
End: 2024-01-04
Payer: MEDICARE

## 2024-01-04 DIAGNOSIS — N18.6 ESRD (END STAGE RENAL DISEASE) (MULTI): Primary | ICD-10-CM

## 2024-01-09 ENCOUNTER — TELEPHONE (OUTPATIENT)
Dept: TRANSPLANT | Facility: HOSPITAL | Age: 54
End: 2024-01-09
Payer: MEDICARE

## 2024-02-05 DIAGNOSIS — Z01.818 PRE-TRANSPLANT EVALUATION FOR KIDNEY TRANSPLANT: ICD-10-CM

## 2024-02-06 ENCOUNTER — OFFICE VISIT (OUTPATIENT)
Dept: TRANSPLANT | Facility: HOSPITAL | Age: 54
End: 2024-02-06
Payer: COMMERCIAL

## 2024-02-06 ENCOUNTER — HOSPITAL ENCOUNTER (OUTPATIENT)
Dept: RADIOLOGY | Facility: HOSPITAL | Age: 54
Discharge: HOME | End: 2024-02-06
Payer: COMMERCIAL

## 2024-02-06 ENCOUNTER — LAB REQUISITION (OUTPATIENT)
Dept: LAB | Facility: CLINIC | Age: 54
End: 2024-02-06
Payer: MEDICARE

## 2024-02-06 ENCOUNTER — SOCIAL WORK (OUTPATIENT)
Dept: TRANSPLANT | Facility: HOSPITAL | Age: 54
End: 2024-02-06
Payer: COMMERCIAL

## 2024-02-06 ENCOUNTER — LAB (OUTPATIENT)
Dept: LAB | Facility: LAB | Age: 54
End: 2024-02-06
Payer: MEDICARE

## 2024-02-06 ENCOUNTER — EDUCATION (OUTPATIENT)
Dept: TRANSPLANT | Facility: HOSPITAL | Age: 54
End: 2024-02-06
Payer: COMMERCIAL

## 2024-02-06 VITALS
BODY MASS INDEX: 32.83 KG/M2 | TEMPERATURE: 98 F | SYSTOLIC BLOOD PRESSURE: 135 MMHG | HEART RATE: 92 BPM | DIASTOLIC BLOOD PRESSURE: 77 MMHG | HEIGHT: 62 IN | OXYGEN SATURATION: 93 % | WEIGHT: 178.4 LBS

## 2024-02-06 DIAGNOSIS — Z01.818 PRE-TRANSPLANT EVALUATION FOR KIDNEY TRANSPLANT: ICD-10-CM

## 2024-02-06 DIAGNOSIS — Z01.818 PRE-TRANSPLANT EVALUATION FOR KIDNEY TRANSPLANT: Primary | ICD-10-CM

## 2024-02-06 DIAGNOSIS — N18.6 END STAGE RENAL DISEASE (MULTI): ICD-10-CM

## 2024-02-06 LAB
ABO GROUP (TYPE) IN BLOOD: NORMAL
ALBUMIN SERPL BCP-MCNC: 4 G/DL (ref 3.4–5)
ALP SERPL-CCNC: 103 U/L (ref 33–110)
ALT SERPL W P-5'-P-CCNC: 6 U/L (ref 7–45)
AMYLASE SERPL-CCNC: 33 U/L (ref 29–103)
AST SERPL W P-5'-P-CCNC: 11 U/L (ref 9–39)
BILIRUB DIRECT SERPL-MCNC: 0.1 MG/DL (ref 0–0.3)
BILIRUB SERPL-MCNC: 0.5 MG/DL (ref 0–1.2)
BUN SERPL-MCNC: 33 MG/DL (ref 6–23)
C PEPTIDE SERPL-MCNC: 12.4 NG/ML (ref 0.7–3.9)
CMV IGG AVIDITY SERPL IA-RTO: REACTIVE %
CREAT SERPL-MCNC: 5.71 MG/DL (ref 0.5–1.05)
EBV EA IGG SER QL: NEGATIVE
EBV NA AB SER QL: POSITIVE
EBV VCA IGG SER IA-ACNC: POSITIVE
EBV VCA IGM SER IA-ACNC: ABNORMAL
EGFRCR SERPLBLD CKD-EPI 2021: 8 ML/MIN/1.73M*2
ERYTHROCYTE [DISTWIDTH] IN BLOOD BY AUTOMATED COUNT: 15.5 % (ref 11.5–14.5)
EST. AVERAGE GLUCOSE BLD GHB EST-MCNC: 82 MG/DL
FLOW AUTOCROSSMATCH: NORMAL
HBA1C MFR BLD: 4.5 %
HBV CORE AB SER QL: NONREACTIVE
HBV SURFACE AB SER-ACNC: >1000 MIU/ML
HBV SURFACE AG SERPL QL IA: NONREACTIVE
HCT VFR BLD AUTO: 33.8 % (ref 36–46)
HCV AB SER QL: NONREACTIVE
HGB BLD-MCNC: 10.7 G/DL (ref 12–16)
HIV 1+2 AB+HIV1 P24 AG SERPL QL IA: NONREACTIVE
HLA CLASS I AB SCREEN,FC: NORMAL
HLA CLASS II AB SCREEN,FC: NORMAL
HLA CLS I TYP PNL BLD/T DONR HIGH RES: NORMAL
HLA RESULTS: NORMAL
HLA-DP2 QL: NORMAL
HLA-DQB1 HIGH RES: NORMAL
HLA-DRB1 HIGH RES: NORMAL
INR PPP: 1.2 (ref 0.9–1.1)
MCH RBC QN AUTO: 30.8 PG (ref 26–34)
MCHC RBC AUTO-ENTMCNC: 31.7 G/DL (ref 32–36)
MCV RBC AUTO: 97 FL (ref 80–100)
NRBC BLD-RTO: 0 /100 WBCS (ref 0–0)
PHOSPHATE SERPL-MCNC: 6.9 MG/DL (ref 2.5–4.9)
PLATELET # BLD AUTO: 309 X10*3/UL (ref 150–450)
PROT SERPL-MCNC: 7.4 G/DL (ref 6.4–8.2)
PROTHROMBIN TIME: 13 SECONDS (ref 9.8–12.8)
RBC # BLD AUTO: 3.47 X10*6/UL (ref 4–5.2)
RH FACTOR (ANTIGEN D): NORMAL
TREPONEMA PALLIDUM IGG+IGM AB [PRESENCE] IN SERUM OR PLASMA BY IMMUNOASSAY: NONREACTIVE
WBC # BLD AUTO: 6.8 X10*3/UL (ref 4.4–11.3)

## 2024-02-06 PROCEDURE — 84520 ASSAY OF UREA NITROGEN: CPT

## 2024-02-06 PROCEDURE — 86803 HEPATITIS C AB TEST: CPT

## 2024-02-06 PROCEDURE — 99215 OFFICE O/P EST HI 40 MIN: CPT

## 2024-02-06 PROCEDURE — 86825 HLA X-MATH NON-CYTOTOXIC: CPT | Mod: OUT | Performed by: SURGERY

## 2024-02-06 PROCEDURE — 82150 ASSAY OF AMYLASE: CPT

## 2024-02-06 PROCEDURE — 71046 X-RAY EXAM CHEST 2 VIEWS: CPT

## 2024-02-06 PROCEDURE — 87389 HIV-1 AG W/HIV-1&-2 AB AG IA: CPT

## 2024-02-06 PROCEDURE — 84100 ASSAY OF PHOSPHORUS: CPT

## 2024-02-06 PROCEDURE — 87340 HEPATITIS B SURFACE AG IA: CPT

## 2024-02-06 PROCEDURE — 80307 DRUG TEST PRSMV CHEM ANLYZR: CPT

## 2024-02-06 PROCEDURE — 86664 EPSTEIN-BARR NUCLEAR ANTIGEN: CPT

## 2024-02-06 PROCEDURE — 99214 OFFICE O/P EST MOD 30 MIN: CPT | Mod: 25,27 | Performed by: STUDENT IN AN ORGANIZED HEALTH CARE EDUCATION/TRAINING PROGRAM

## 2024-02-06 PROCEDURE — 99204 OFFICE O/P NEW MOD 45 MIN: CPT | Performed by: STUDENT IN AN ORGANIZED HEALTH CARE EDUCATION/TRAINING PROGRAM

## 2024-02-06 PROCEDURE — 87799 DETECT AGENT NOS DNA QUANT: CPT

## 2024-02-06 PROCEDURE — 86665 EPSTEIN-BARR CAPSID VCA: CPT

## 2024-02-06 PROCEDURE — 86900 BLOOD TYPING SEROLOGIC ABO: CPT

## 2024-02-06 PROCEDURE — 36415 COLL VENOUS BLD VENIPUNCTURE: CPT

## 2024-02-06 PROCEDURE — 81382 HLA II TYPING 1 LOC HR: CPT | Mod: 59,OUT | Performed by: SURGERY

## 2024-02-06 PROCEDURE — 86704 HEP B CORE ANTIBODY TOTAL: CPT

## 2024-02-06 PROCEDURE — 82565 ASSAY OF CREATININE: CPT

## 2024-02-06 PROCEDURE — 83036 HEMOGLOBIN GLYCOSYLATED A1C: CPT

## 2024-02-06 PROCEDURE — 80323 ALKALOIDS NOS: CPT

## 2024-02-06 PROCEDURE — 85027 COMPLETE CBC AUTOMATED: CPT

## 2024-02-06 PROCEDURE — 1036F TOBACCO NON-USER: CPT | Performed by: STUDENT IN AN ORGANIZED HEALTH CARE EDUCATION/TRAINING PROGRAM

## 2024-02-06 PROCEDURE — 84681 ASSAY OF C-PEPTIDE: CPT

## 2024-02-06 PROCEDURE — 80076 HEPATIC FUNCTION PANEL: CPT

## 2024-02-06 PROCEDURE — 86706 HEP B SURFACE ANTIBODY: CPT

## 2024-02-06 PROCEDURE — 86663 EPSTEIN-BARR ANTIBODY: CPT

## 2024-02-06 PROCEDURE — 86644 CMV ANTIBODY: CPT

## 2024-02-06 PROCEDURE — 86481 TB AG RESPONSE T-CELL SUSP: CPT

## 2024-02-06 PROCEDURE — 86901 BLOOD TYPING SEROLOGIC RH(D): CPT

## 2024-02-06 PROCEDURE — 85610 PROTHROMBIN TIME: CPT

## 2024-02-06 PROCEDURE — 86780 TREPONEMA PALLIDUM: CPT

## 2024-02-06 SDOH — ECONOMIC STABILITY: FOOD INSECURITY: WITHIN THE PAST 12 MONTHS, THE FOOD YOU BOUGHT JUST DIDN'T LAST AND YOU DIDN'T HAVE MONEY TO GET MORE.: NEVER TRUE

## 2024-02-06 SDOH — ECONOMIC STABILITY: FOOD INSECURITY: WITHIN THE PAST 12 MONTHS, YOU WORRIED THAT YOUR FOOD WOULD RUN OUT BEFORE YOU GOT MONEY TO BUY MORE.: NEVER TRUE

## 2024-02-06 ASSESSMENT — ANXIETY QUESTIONNAIRES
2. NOT BEING ABLE TO STOP OR CONTROL WORRYING: NOT AT ALL
3. WORRYING TOO MUCH ABOUT DIFFERENT THINGS: SEVERAL DAYS
IF YOU CHECKED OFF ANY PROBLEMS ON THIS QUESTIONNAIRE, HOW DIFFICULT HAVE THESE PROBLEMS MADE IT FOR YOU TO DO YOUR WORK, TAKE CARE OF THINGS AT HOME, OR GET ALONG WITH OTHER PEOPLE: SOMEWHAT DIFFICULT
5. BEING SO RESTLESS THAT IT IS HARD TO SIT STILL: NOT AT ALL
7. FEELING AFRAID AS IF SOMETHING AWFUL MIGHT HAPPEN: NOT AT ALL
6. BECOMING EASILY ANNOYED OR IRRITABLE: SEVERAL DAYS
1. FEELING NERVOUS, ANXIOUS, OR ON EDGE: NOT AT ALL
4. TROUBLE RELAXING: NOT AT ALL
GAD7 TOTAL SCORE: 2

## 2024-02-06 ASSESSMENT — PATIENT HEALTH QUESTIONNAIRE - PHQ9
SUM OF ALL RESPONSES TO PHQ9 QUESTIONS 1 & 2: 1
5. POOR APPETITE OR OVEREATING: NOT AT ALL
8. MOVING OR SPEAKING SO SLOWLY THAT OTHER PEOPLE COULD HAVE NOTICED. OR THE OPPOSITE, BEING SO FIGETY OR RESTLESS THAT YOU HAVE BEEN MOVING AROUND A LOT MORE THAN USUAL: NOT AT ALL
4. FEELING TIRED OR HAVING LITTLE ENERGY: SEVERAL DAYS
7. TROUBLE CONCENTRATING ON THINGS, SUCH AS READING THE NEWSPAPER OR WATCHING TELEVISION: NOT AT ALL
3. TROUBLE FALLING OR STAYING ASLEEP OR SLEEPING TOO MUCH: SEVERAL DAYS
9. THOUGHTS THAT YOU WOULD BE BETTER OFF DEAD, OR OF HURTING YOURSELF: NOT AT ALL
2. FEELING DOWN, DEPRESSED OR HOPELESS: NOT AT ALL
2. FEELING DOWN, DEPRESSED OR HOPELESS: NOT AT ALL
SUM OF ALL RESPONSES TO PHQ9 QUESTIONS 1 & 2: 0
1. LITTLE INTEREST OR PLEASURE IN DOING THINGS: NOT AT ALL
SUM OF ALL RESPONSES TO PHQ QUESTIONS 1-9: 3
1. LITTLE INTEREST OR PLEASURE IN DOING THINGS: SEVERAL DAYS
10. IF YOU CHECKED OFF ANY PROBLEMS, HOW DIFFICULT HAVE THESE PROBLEMS MADE IT FOR YOU TO DO YOUR WORK, TAKE CARE OF THINGS AT HOME, OR GET ALONG WITH OTHER PEOPLE: SOMEWHAT DIFFICULT
6. FEELING BAD ABOUT YOURSELF - OR THAT YOU ARE A FAILURE OR HAVE LET YOURSELF OR YOUR FAMILY DOWN: NOT AT ALL

## 2024-02-06 ASSESSMENT — PAIN SCALES - GENERAL: PAINLEVEL: 0-NO PAIN

## 2024-02-06 NOTE — PROGRESS NOTES
Patient attended in-person consent signing on 2/6/24.  Virtual education was completed prior to in-person consent signing.  Accompanied to class with her mom, Bethanie.  Patient remained attentive throughout the review session, asking appropriate questions.  Evaluation consents were signed.     PRE-TRANSPLANT EDUCATION  Patient received education regarding the following topics as part of their pre-transplant evaluation:  The evaluation process, including:   Transplant team members and roles    Required consultations and testing   Selection criteria and suitability for transplant   Listing process and receiving an organ offer   Psychosocial and financial considerations for a successful transplant   Patient responsibilities, including the necessity of adhering to a strict medical regimen  An overview of the surgical procedure   Potential medical, surgical, and psychosocial risks to transplantation, including:   Wound infection   Pneumonia   Blood clot formation   Organ rejection, failure, and possibility of re-transplantation   Lifetime immunosuppression therapy and associated risks   Arrhythmias and cardiovascular collapse   Multi-organ system failure   Death   Depression   Post-Traumatic Stress Disorder   Generalized anxiety, issues of dependence, and feelings of guilt  Available alternatives to transplantation  Donor risk factors that could affect the success of the transplant and the health of the patient, including:   Donor age   Donor medical and social history   Condition of the organ   Risk of rebeca cancer, HIV, Hepatitis B, Hepatitis C, or malaria if the infection is not detectable at the time of donation  Patient?s right to withdraw consent for transplantation at any time during the process  Transplants not performed in a Medicare-approved transplant center could affect the patient?s ability to have immunosuppression medication paid for under Medicare part B.   Multiple listing options.    Patient was  given the opportunity to have questions answered. Patient was provided a copy of the informed consent for transplant evaluation.    Signed evaluation informed consent received? YES

## 2024-02-06 NOTE — PROGRESS NOTES
Transplant Nephrology Evaluation :    Rekha Gonzáles is a 53 y.o.  came for Kidney transplant Evaluation .    History in detail : Ms. Ford is a 53-year-old female with a history of AL kappa Amyloidosis s/p chemotherapy and stem cell transplant done at [facility] in 2019 was currently on dialysis since 2019 Monday Wednesday Friday at [facility].  Dr. Purcell is her nephrologist.    Other history include gastric bypass surgery done in 5/20/2022, GERD, hypothyroidism and obstructive sleep apnea on CPAP at nighttime.  Patient is currently active at [facility] for transplant center and was put down at [facility] due to excess weight.    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  -Reference to the dialysis: Patient is getting dialyzed through left upper extremity AV fistula no issues with the access recently.  Patient have issues with the low blood pressures on hemodialysis because of adjusting blood pressure pills recently adjusting further.    -He is anuric at baseline, YOLANDE myelomatosis spoon through kidney biopsy.  Dry weight is around 175 kg.    Psychiatric issues : Patient have anxiety and depression for which she is getting Celexa  Recent hospitalizations : No recent hospitalizations  Recent Transfusions : Patient received total blood transfusions when she received bone marrow transplantation  Pain medication issues : Does not use any pain medications  Hx of kidney stones : No history of kidney stones    Past Medical History : Past medical history of for GERD hypothyroidism, obstructive sleep apnea and amyloidosis s/p bone marrow transplant and  "chemotherapy    Surgical History: History of bariatric surgery done in 2022 and hernia repair at the same time, history of gallbladder cholecystectomy done in 1993 and 2 C-sections with tubal ligations.    Family HX: Family history of dad having congestive heart failure, half brother have some throat cancer and brother have prostate cancer.  Mother is healthy    Social Connections: Not on file   Lives with her  and daughter.  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  Drinks socially denied any drug use or smoking use.        PROBLEM LIST:  Active Problems:  There are no active Hospital Problems.         ALLERGIES:  Not on File         CURRENT MEDICATIONS:  Scheduled medications    Continuous medications    PRN medications         OBJECTIVE:    VITALS: Visit Vitals  /77   Pulse 92   Temp 36.7 °C (98 °F) (Temporal)   Ht 1.575 m (5' 2\")   Wt 80.9 kg (178 lb 6.4 oz)   SpO2 93%   BMI 32.63 kg/m²   BSA 1.88 m²        General: No distress   Mucosa moist   AI, AC, AF     HEENT: PEERLA  CVS: S1 S2 no murmurs  RESP:  Lungs clear to auscultation   ABDO: Soft, non-tender   Neuro: A + O x 3  Skin: No rash   Extremities: No edema       LABS:  Results from last 72 hours   Lab Units 02/06/24  1021   WBC AUTO x10*3/uL 6.8   HEMOGLOBIN g/dL 10.7*   MCV fL 97   PLATELETS AUTO x10*3/uL 309   BUN mg/dL 33*   CREATININE mg/dL 5.71*          [unfilled]       ASSESSMENT AND PLAN:    Ms. Ford is a 53-year-old female with a history of AL kappa Amyloidosis s/p chemotherapy and stem cell transplant done at [facility] in 2019 was currently on dialysis since 2019 Monday Wednesday Friday at [facility].  Dr. Purcell is her nephrologist.    Seems to be reasonable candidate to evaluate for kidney transplantation  -Karnofsky score is greater than 90  -Need oncology clearance  -Patient needs updated age-appropriate screening like colonoscopy,  and Pap smear.  -Mammogram which was negative as of January " .  -Stress test done in 2023 was normal.  Need echocardiogram updated and CT cardiac scoring probably might need MRI of the heart to evaluate further about any cardiac amyloidosis and cardiology clearance.  -She does have CAT scan abdomen pelvis done at [facility] need to get images from the [facility].  -Will discuss further once the testing is done in the selection committee.    I had a discussion with this patient regarding 1 year graft and patient survival statistics following renal transplantation for both living and  donor allograft recipients. This data included [facility] data compared to National data readily available for review on https://www.SRTR.org. The patient also had attended the kidney transplant education class provided by the transplant institute.    Further discussion included:  -The transplant selection committee process.  -The need for lifelong immunosuppressive therapy, and the side effects of these medications including the risk of infections, cancer, and lymphoma.  -The wait list time approximately is 5 years or more for  donor transplants and the statistical superiority of a living donor.  -The patient was re-educated regarding the responsibilities of being listed on the transplant waitlist.  - Patient encouraged to avoid blood transfusion unless it's deemed a medically necessary.  -Educated patient on the current allocation policy per [facility] regarding kidney and/or kidney-pancreas/pancreas transplant.  - Education covered the need for monthly serum samples to be drawn and sent to the [facility] while actively listed.  - The patient was told to inform the Pre Transplant office if there are any changes in their medical condition, demographics, insurance coverage ( including medication coverage) and or dialysis units.  - The patient was reminded to fax test results of studies that were obtained outside  facility.  - Using identified donors with risk criteria for  transmission of infection  -Potential transmission of infectious disease from any  donors, as well as living donors.   -The possibility of transmission of tumors and infections via the transplanted organ.  -The inability to completely test for all potential harmful tumors or infectious agents.  -The possibility of listing at multiple locations.    Surgical complications including need for reoperation(s) including but not limited to:  -Bleeding.  -Repair of leaks.  -Control of infection.  -Possible kidney transplant removal.    The medical complications including but not limited to:  -Death.  -Cardiac.  -Pulmonary.  -Infectious.  -Neurologic.  -Other Complications.    We also discussed how the kidney transplant could function:  -Non-function and possible kidney transplant removal within the first 3 to 6 months.  -Delayed graft function (dialysis needed after transplant).  -The potential of recurrence of kidney disease leading to kidney transplant graft loss.    Thank you for consulting :  Rian Brown MD

## 2024-02-06 NOTE — PROGRESS NOTES
ENCOUNTER    Visit Type Initial Visit  Location: [place] [address]    Barriers to Communication / Understanding:   [] Language [] Vision [] Hearing [] Other      []  Present     Accompanied By: MomBethanie     Organ For Transplant:  Kidney    Ethnicity:  White    ADLs Fully Independent      Instrumental ADLs Fully Independent      Level of Activity Active      DME: Denied     Knowledge of Health Good  Hypertension, a rare disease that is not considered cancer.     Why Do You Have End Stage Organ Disease  Amyloidosis.     Knowledge of Transplant / VAD:  Yes Patient Is Able To Make An Informed Decision    Patient Understands the Risks of Transplant / VAD  Yes Rejection  Yes Infection Yes Complications  Yes Death    Patient Understands Recovery and Follow-Up from Transplant / VAD  Yes Length Of State Yes Appointments  Yes Labs  Yes Rehabilitation    Patient Has Identified Goals of Transplant / VAD Yes  Pt reported to have a better quality of life.     Any Potential Donors?     Overall Compliance  good     Pt reported taking 7 meds daily.   Compliance With Medications good  Pt shared if she forgets (every now and then) she will take them at a later time   Managed By Patient bottles     Understanding Of Medication  good  Compliance With Appointments good    How Does Patient Handle Health Problems      Organ  Kidney    IOP:     Fluid Restriction:   Yes [x] Compliant   32 oz   Medical And Clinic Appointment Compliant Yes    Dialysis:  [x] What Dialysis Center [facility]  [x] Began March of 2019      [x] In Center [] Home Hemo [] Peritoneal       Attendance:  Treatment Attendance Good Treatment Time M,W,F and runs for 3.5 hours    [] Shortened Treatments [x] Rescheduled Treatments [] Missed Treatments       Fluids:     Does Patient Still Urinate  [] Fluid Restriction [] IDWG [] EDW  Achieved Dry Weight        Diet:   Patient is compliant with renal restrictions    Yes Patient is compliant with low  "sodium diet      Labs:    [] Patient Reported [] Collateral       []  Potassium [] Albumin [] Phosphorus      # of Binders:  [x] # of Binders per meal 1-2 [x] Meals per day 2-3      []  # of Binders per Snack [] Snacks per day [] Phosphorus     Pancreas:  [] Checks blood sugar      times/day [] A1c   Hypoglycemic Episodes  Unawareness  Outside Interventions    Liver:  Is Lactulose prescribed  Dose:   Timesper day:  Is patient compliant       SOCIAL HISTORY  No       Education:  education: Some College    Literate Yes   Computer literate Yes  Internet access Yes       Sources of Income: Pt shared she began receiving disability in 2020. Pt reported she receives $3,500/monthly in disability. Pt shared her  works full-time.   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 [employer]    Will patient have paid status from employment during recovery       Spouse / SO Current Employment Full Time      Will spouse / SO have paid status from employment during recovery Yes  PTO      Other Sources of Income Total Household Income $100,000/yearly       Does patient have financial concerns No     Is patient able to meet current monthly expenses Yes    Resources:      Patient was provided information on transplant fundraising       Insurance:  Primary Insurance Medicare    Secondary Insurance Spouse Ziggy BCBS     Prescription Coverage Copay cost per month $0    Medicare coverage due to     Medicare Part A  Effective date    Medicare Part B  Effective date    Medicare Part C  Deductable  Out of Pocket Max    Medicare Part D  Extra Help?    Medicaid  Waiver  Redetermination Date    HMO       FAMILY SYSTEM    Single    Yes How long 25 years   Describe Relationship  \"Good\"     How long   Describe " Relationship    When    When  In a Relationship   How Long  Describe Relationship    Spouse / SO Name Kole   Age 52  Health  Good, smokes and drinks pop   Other Caregiver Responsibilities Denied   Spouse / Significant others reaction to donation    Children:  Yes # Biological 1 son, 1 daughter  # Adopted    # Step Children        Child #1 Name Pawel  Age 23  Health  Good     Lives [place]   How Much Contact Weekly      Child #2 Name Mary   Age 19  Health Good     Lives With patient  How Much Contact Daily    Parents:  Raised By Both Biological Parents Two parents until she was a teenager     Did the patient have contact with the other parent     Mother  No Age   Cause of Death   Father  Yes Age 77  Cause of Death  Diabetic in older age, pneumonia, heart failure, a stomach hernia, thyroid problems, CHF, hypertension    Living Parent #1 Bethanie, jorge next door, daily contact     Additional Information    Siblings:  [x] # Biological (3 sisters, 2 brothers) [] # Half Siblings [] # Step Siblings   Close with them all     Sibling #1  Adiel, lives local  Sibling #2  Destinee, lives [facility]   Sibling #3 Gladys, lives in [place] 2 hours away  Sibling #4 Lloyd, lives [facility]   Sibling #5 Ruthie, lives [facility]     Support & Recovery Plan:  Yes Adequate    Primary Support:  Name Bethanie  Phone Home [phone] cell [phone]  Age 78  Relationship to Patient Mom   If employed, can they take time off work  retired   If so, is it paid time off    If not, will this impact your finances No   Did they attend education classes Yes   Do they have other caregiver responsibilities (child or eldercare) No   Do they have their own conditions which may prevent them from providing care for you No  (Medical, psychological, physical limitations)    Are they available on short notice Yes   Are they reliable Yes   Are they responsible Yes   Are they able to understand and process new information  Yes  Do they  "have reliable transportation or will you allow them to use your vehicle Yes   Are they currently involved in your care  Yes  Comments    Secondary Support:  Name Kole  Phone [phone] Age 51  Relationship to Patient    If employed, can they take time off work Yes   If so, is it paid time off Yes   If not, will this impact your finances No   Did they attend education classes No   Do they have other caregiver responsibilities (child or eldercare) No   Do they have their own conditions which may prevent them from providing care for you No  (Medical, psychological, physical limitations)    Are they available on short notice Yes   Are they reliable Yes   Are they responsible Yes   Are they able to understand and process new information Yes   Do they have reliable transportation or will you allow them to use your vehicle Yes   Are they currently involved in your care Yes   Comments  Call after 3:30     Alternate Support  Name Mary     Phone Number [phone]   Age 19   Relationship Daughter   If employed, can they take time off work Yes \"I think so\"   If so, is it paid time off Yes   If not, will this impact your finances No   Did they attend education classes No   Do they have other caregiver responsibilities (child or eldercare) No   Do they have their own conditions which may prevent them from providing care for you No  (Medical, psychological, physical limitations)    Are they available on short notice Yes   Are they reliable Yes   Are they responsible Yes   Are they able to understand and process new information Yes   Do they have reliable transportation or will you allow them to use your vehicle Yes   Are they currently involved in your care Yes   Comments      Housing:  Yes Adequate Owns home  Type of Home House [place]   Distance to [facility] 1 hour   Pets 1 dog   Does Patient Feel Safe in Home Yes      Transportation:  Yes Adequate  # Licensed Drivers in the Home 3  Does Patient Drive Yes If not, why  # " "Reliable Vehicles 3  Does Patient use Public Transportation No  Does Patient use Medical Transportation No  Comments    MENTAL HEALTH    Cognition:  Reported Impairment  Nothing of concern     The patient reports their mood as \"Good.\"      Reported Mental Health Diagnosis Depression and anxiety diagnosed in early 90's.   Family History of Mental Health Concerns: Dad- depression and anxiety   What are patient psychosocial stressors Denied. Pt shared she has been thinking of all of the \"what ifs\" recently and this has been causing anxiety.         Current Medications:  Yes  Mental Health Meds Celexa 20 mg- helpful   Rx'd by PCP  Sleep Meds Melatonin    Rx'd by   Pain Meds Denied   Rx'd by     OTC Meds Tylenol and motrin every now and then   Past Medications Pt shared she tried \"lots of different MH medications\" in the past. Pt shared none of them were helpful.      Counseling Previously  Pt reported years ago she went to individual therapy for 6 months, going once a month. Pt shared she went through [employer] in [facility]. Pt shared she did not find this helpful. Pt declined resources at this time.     Has patient ever been hospitalized for mental health reasons No   Was the hospitalization voluntary  Duration   Where    When  Describe situation    Discharge Plan for Follow Up  Was Discharge Plan Completed   Referral to Transplant Psych Yes  Mental Health Follow Up Required      Suicide Assessment:  History of Suicide Ideation No  [] Timeframe [] Frequency   Frequency   Plan Created  Intent to Follow Through  Outcome      History of Suicide Attempt No     History of Suicidal Ideation in the past 3 months No   Intensity   Duration     Description of Plan      Plans thought of  Intent to Follow Through  Highest Level of Intent to Follow Through    Current Plan for Safety    Plan for Follow-Up    Patient's Reported Trauma History: Yes- \"first  was traumatic.\"      What are patient's coping behaviors Pt shared she " enjoys reading, walking, exercising, playing cards with family, and crafting    Hoahaoism / Spirituality Mormonism     Attitude toward interviewer Cooperative and Appropriate    Eye Contact Patient maintained good eye contact throughout appointment    Appearance The patient was neatly groomed, appropriately dressed and adequately nourished    Affect Appropriate    Thought Process Appropriate    Substance Use /Abuse History:    Current Tobacco User No  Patient uses   Tobacco Frequency   For How Long  Is Patient Required to Quit       Former Tobacco User Yes  Describe past tobacco use and date quit  Pt reported she smoked cigarettes for awhile when she was younger when she went out with friends. Pt shared she smoked cigarettes for less than 5 years, smoking one every few months at 19 years old.     Current Alcohol User No  Type of Alcohol Used   Amount  Frequency   Pattern of Alcohol Use    Is Patient Required to Quit   Continued to use the substance despite being told the substance is affecting their health    History of problems at work, school or home due to substance use      Former Alcohol User Yes  Describe past alcohol use and date quit  Pt shared she had a few sips of a beer a few months ago. Pt shared she would also drink beer in the past. Pt shared at her heaviest alcohol use, she would drink every other month having 3 beers in a sitting. Pt denied receiving any DUI's.     Has patient ever gone to CD treatment No  If yes, When, Where and What type of Program  Attends AA meetings    Sponsor  Do support people drink alcohol   If yes, describe support people's use  Is alcohol kept in the home   Does Patient need to sign a CD contract     Current Illegal / Unprescribed Drug User No  Type of Illegal Drug Used   Frequency  Pattern of Drug Use    Is Patient Required to Quit     Illegal / Unprescribed Drug #2  Type of Illegal Drug Used   Frequency  Pattern of Drug Use      Continue to use the substance despite being  told the substance is affecting their health    History of problems at work, school or home due to substance use      Former Illegal / Unprescribed Drug User Yes  Describe past illegal drug use and date quit  Pt shared she tried marijuana at 19 years old once or twice with her friends. Pt shared she shared a blunt with her friends both times.      Has patient ever gone to CD treatment   If yes, When, Where and What type of Program   Attends AA/NA  meetings    Is patient on a Methadone / Suboxone regiment   Do support people use illegal drugs   If yes, describe support people's use  Are illegal drugs kept in the home   Does Patient need to sign a CD contract     Illegal / Unprescribed Drug #2  Type of Illegal Drug Used   Frequency  Pattern of Drug Use    Prescription Drug Abuse:  No Has patient experienced feelings of addiction  No Has patient experienced symptoms of withdrawal  No Has patient experienced any side effects? e.g.  hallucinations or delusions    Does Patient Meet the Criteria for Alcohol Use Disorder No Diagnosis  Does Patient Meet OSOTC guidelines No  Does Patient Meet the Criteria for Illegal Drug Use Disorder No  Diagnosis  Does Patient Meet OSOTC guidelines Yes    [facility]OTC Substance Relapse Risk Factors   DSM-5 Severity Factors:     IOP     LEGAL ISSUES  No Arrests   Currently probation or parole    custodial   When   How long   Where       Citizenship:  No US Citizen   Green Card  Visa    Advance Directives: Yes- mom and  listed   HCPOA Requested Copy  Requested Copy    Guardian:    AMOR TATUM met with Pt and Pt's mom, Bethanie for initial psychosocial assessment. Pt was pleasant and engaged. Pt reported her primary insurance is Medicare and her secondary is her spouse's insurance, [place] BCBS. Pt demonstrated an excellent understanding of the transplant process. Pt denied any current financial concerns. Pt shared the cause of her kidney disease is amyloidosis. Pt shared her goal of  "transplant is to have a better quality of life. Pt reported good compliance with her medications, medical appointments, and dialysis. Pt listed her mom, Bethanie as primary support and her , Kole as secondary support. Pt listed her daughter, Mary as an alternate support. SW to call and confirm Pt's secondary support. Pt reported her current mood as \"good.\" Pt shared she was diagnosed with depression and anxiety in the 1990's. Pt shared she currently takes Celexa for symptoms of depression and anxiety, prescribed by her PCP. Pt shared this is helpful for her. Pt shared she went to therapy \"years ago\" for 6 months and did not find this helpful. Pt denied any past hospitalizations for MH. Pt scored a 3 on the PHQ-9, indicating minimal clinical depression. Pt scored a 2 on the SHAHLA-7, indicating minimal clinical anxiety. Pt to meet with transplant psych. Pt denied any current tobacco use. Pt shared she smoked cigarettes for 5 years in her 20's. Pt shared she would only smoke once every few months while out with her friends. Pt denied any current alcohol use. Pt reported she had a few sips of a beer \"a few months ago.\" Pt shared at her heaviest alcohol use, she would drink once every other month having 3 beers in a sitting. Pt denied any current illicit drug use. Pt shared that she tried marijuana \"once or twice\" in her 20's. Pt shared each time she smoked she shared a blunt with her friends. Pt denied any other past illicit drug use. Pt shared that her mom, Bethanie and , Kole are listed as her HCPOA. Pt scored a 13 on the SIPAT, indicating Pt is a good candidate for transplant. SW would recommend listing Pt, once Pt's secondary support is confirmed.     PLAN  Pt to meet with transplant psych. SW will await transplant psych recommendations. SW to call and confirm Pt's secondary support. SW to follow-up with Pt annually.   "

## 2024-02-06 NOTE — PATIENT INSTRUCTIONS
Thank You for coming to [facility].  You are currently in evaluation for kidney transplant.  In order to be eligible to be placed on the wait list you must complete certain tests and appointments.  The following tests/appointments will be scheduled for you:    Chest xray  CT cardiac score    Cardiology consult  Virtual finance consult    Please complete the following testing with your primary care doctor:    Colonoscopy  Pap smear    Please call [phone] with any questions or if you need assistance.    Marylin CABANN, RN Transplant Coordinator

## 2024-02-07 ENCOUNTER — DOCUMENTATION (OUTPATIENT)
Dept: TRANSPLANT | Facility: HOSPITAL | Age: 54
End: 2024-02-07
Payer: MEDICARE

## 2024-02-07 DIAGNOSIS — Z01.818 PRE-TRANSPLANT EVALUATION FOR KIDNEY TRANSPLANT: ICD-10-CM

## 2024-02-07 RX ORDER — OMEPRAZOLE 20 MG/1
20 CAPSULE, DELAYED RELEASE ORAL
COMMUNITY
Start: 2022-01-25

## 2024-02-07 RX ORDER — ACETAMINOPHEN 500 MG
2000 TABLET ORAL DAILY
COMMUNITY
Start: 2022-01-25

## 2024-02-07 RX ORDER — LEVOTHYROXINE SODIUM 50 UG/1
1 TABLET ORAL
COMMUNITY
Start: 2023-02-16

## 2024-02-07 RX ORDER — AMLODIPINE BESYLATE 10 MG/1
1 TABLET ORAL
Status: ON HOLD | COMMUNITY
Start: 2023-12-14 | End: 2024-06-06 | Stop reason: WASHOUT

## 2024-02-07 RX ORDER — CYANOCOBALAMIN (VITAMIN B-12) 1000 MCG
1 TABLET, SUBLINGUAL SUBLINGUAL 3 TIMES WEEKLY
COMMUNITY
Start: 2022-06-02

## 2024-02-07 RX ORDER — MULTIVITAMIN
1 TABLET ORAL
COMMUNITY
End: 2024-04-08 | Stop reason: WASHOUT

## 2024-02-07 RX ORDER — CITALOPRAM HYDROBROMIDE 20 MG
1 TABLET ORAL
COMMUNITY
Start: 2019-03-25

## 2024-02-07 RX ORDER — UREA 10 %
50 LOTION (ML) TOPICAL DAILY
COMMUNITY

## 2024-02-07 RX ORDER — LANTHANUM CARBONATE 1000 MG/1
1000 TABLET, CHEWABLE ORAL
Status: ON HOLD | COMMUNITY
End: 2024-06-06 | Stop reason: WASHOUT

## 2024-02-07 NOTE — PROGRESS NOTES
Eval Clinic Note:  Patient attended evaluation appts on 2/6/24 with Dr. Urias and Dr. Modi.  Medications and allergies reviewed with patient.  Patient presented to appointment with her Mom, Bethanie.  Patient ambulated independently.    History:  Patient has a history of amyloidosis, GERD, Hypothyroid, KINSEY, Bariatric Joe en y surgery.   Dialysis: Hemodialysis on M-W-F, ACCESS [facility]IAN fistula, since 2019.  Testing completed recently: Chest xray, Echocardiogram, stress test, CT abdomen and pelvis, and mammogram  Testing needed for evaluation: CT cardiac score, Colonoscopy, pap smear  Listing Consent: KDPI >85%, DCD, Hep C, Hep B  Comments:  Provided patient with my contact info for questions and concerns.      LISTING EDUCATION    Patient educated regarding the following prior to placement on the transplant waiting list:   The patient?s medical condition, prognosis, and treatment plan.   The expectations and patient responsibilities while on the waiting list, including:   Keeping the transplant center informed of any changes in contact information or insurance coverage   Notifying the transplant center of any changes in medical status   Required testing and/or re-evaluation appointments while awaiting transplant   An overview of the surgical procedure, including potential risks and alternatives.   Information regarding what to expect during the inpatient admission and recovery period.   A discussion regarding organ offers and types of potential donors, including potential risks that may be associated with specific types of donors that could affect the success of the transplant or the health of the patient.  The right to refuse transplantation.     Patient was given the opportunity to have questions answered. Patient was provided a copy of the informed consent for transplant listing.    Education provided by:  Transplant Coordinator: Marylin Luna RN  Transplant Physician: Trevon Urias MD    Signed listing informed  consent received? YES  Patient agrees to be listed for the following:  KDPI > 85% [x]  Donors After Circulatory Death (DCD) [x]  Donors with a Positive Core Antibody for Hepatitis B [x]  Donors with Hepatitis C Virus to recipients with hepatitis C []  Donors with Hepatitis C Virus to Negative hepatitis C recipients [x]    Patient will be discussed at an upcoming selection committee to determine eligibility to be placed on the [facility] waiting list.

## 2024-02-07 NOTE — PROGRESS NOTES
Kidney Transplant Evaluation Office Visit    Chief Complaint: Patient presents for kidney transplant evaluation    History of Present Illness:  Rekha Gonzáles  is a 53 y.o. female presents with ESRD from a history of AL Kappa amyloidosis requiring chemotherapy and autologous stem cell transplant in 2019.     Denies any history of cardiac amyloidosis.    She is on HD since mid-. She is anuric for about 5 years.    She has lost about a 100 lbs since the RNYGB in . Denies any previous history of kidney stones. Has had 1-2 episodes of UTI.    In clinic today, she denies any chest pain, SOB, palpitations, as well as any recent fever, abdominal pain, difficulty voiding or other urinary symptoms, constipation, or poor oral intake.    Hemodialysis:  Monday, Wednesday, Friday.   Dialysis Access: LUE AVF  Urine Status: anuric  Disease Etiology: Amyloid nephropathy.   Disease Complications: ESRD on HD, Hypertension  PVD: NO on exam, CT import pending  Prior Abdominal Surgery: YES - RNYGB,    Prior Malignancy: NO   BMI: 33  Potential Living Donors: NO     Review of Systems:  Cardiac: Denies chest pain, palpitations  : Normal urine output. Denies history of gross hematuria, nephrolithiasis, urinary retention, or recurrent UTIs.  Vascular: Denies personal or familial history of DVT/PE. No active claudication or non-healing LE wounds.  Extremities: LE Edema -   Functional Status: Can walk up 2 flights of stairs    Past Medical History:  Amyloidosis  ESRD on HD  Obesity  GERD    Past Surgical History:  RNYGB  Cholecystectomy   x2    Social History:  No smoking or etoh  Mom and  primary support      Transfusions: Multiple in   Pregnancy: Yes  Prior transplant: No    Family History:  Mother: n/a  Father: CHF  Sibling: n/a    Physical Exam:  There were no vitals filed for this visit.  Gen: A+OX3; NAD  HEENT: PERRL, sclera anicteric, MMM  Cardiac: RRR  Chest: Normal  inspiratory effort  Abdomen: S/NT/ND.  Ext: No LE edema  Vascular: 2+ palpable femoral pulses  Psychiatric: Normal mood, affect    Assessment/Plan:  - The patient is a reasonable candidate for kidney transplantation.    - Routine age/gender based screening    - Cardiac testing per protocol: ECHO/stress test in 2023 with EF of 63% - complete CT Cardiac score    - S.oxalate level    - Non-contrast CT scan abdomen/pelvis - import from OSH    - Good functional status    Surgical Considerations:  Review outside CT once available    Transplant Education:    I had a discussion with this patient regarding 1 year graft and patient survival statistics following renal transplantation for both living and  donor allograft recipients. This data included [facility] data compared to National data readily available for review on https://www.SRTR.org. The patient also had attended the kidney transplant education class provided by the transplant institute.     The difference between allograft function was discussed comparing living donor, KDPI 0-85%, and >85% kidneys.     Further discussion included:  -The transplant selection committee process.  -The need for lifelong immunosuppressive therapy, and the side effects of these medications including the risk of infections, cancer, and lymphoma.  -The wait list time approximately is 5 years or more for  donor transplants and the statistical superiority of a living donor.     -Using identified donors with risk criteria for transmission of infection  -The possibility of utilizing  donors with known HCV antibody and/or HEATHER positivity and post-transplant treatment/surveillance protocol  -Potential transmission of infectious disease from any  donors, as well as living donors.   -The possibility of transmission of tumors and infections via the transplanted organ.  -The inability to completely test for all potential harmful tumors or infectious  agents.  -The possibility of listing at multiple locations.     Surgical complications including need for reoperation(s) including but not limited to:  -Bleeding.  -Repair of leaks.  -Control of infection.  -Blood clots in the transplant vessels.  -Possible kidney transplant removal.     The medical complications including but not limited to:  -Death.  -Cardiac.  -Pulmonary.  -Infectious.  -Neurologic.  -Other Complications.     We also discussed how the kidney transplant could function:  -Non-function and possible kidney transplant removal within the first 3 to 6 months.  -Delayed graft function (dialysis needed after transplant).  -The potential of recurrence of kidney disease leading to kidney transplant graft loss.    Time Attestation:  I spent 60 minutes with the patient, over 50 minutes in counseling and education as outlined above.    Trevon Urias MD, [facility]  Transplant & Hepatobiliary Surgery

## 2024-02-08 LAB
AMPHETAMINES SERPL QL SCN: NEGATIVE NG/ML
ANNOTATION COMMENT IMP: NORMAL
BARBITURATES SERPL QL SCN: NEGATIVE NG/ML
BENZODIAZ SERPL QL SCN: NEGATIVE NG/ML
BUPRENORPHINE SERPL-MCNC: NEGATIVE NG/ML
CANNABINOIDS SERPL QL SCN: NEGATIVE NG/ML
COCAINE SERPL QL SCN: NEGATIVE NG/ML
EBV VCA IGM SER-ACNC: <10 U/ML (ref 0–43.9)
METHADONE SERPL QL SCN: NEGATIVE NG/ML
METHAMPHET SERPL QL: NEGATIVE NG/ML
NIL(NEG) CONTROL SPOT COUNT: NORMAL
OPIATES SERPL QL SCN: NEGATIVE NG/ML
OXYCODONE SERPL QL: NEGATIVE NG/ML
PANEL A SPOT COUNT: 0
PANEL B SPOT COUNT: 0
PCP SERPL QL SCN: NEGATIVE NG/ML
POS CONTROL SPOT COUNT: NORMAL
T-SPOT. TB INTERPRETATION: NEGATIVE
VZV QPCR,QUANT,PLASMA, VIRC: NOT DETECTED COPIES/ML

## 2024-02-10 LAB
COTININE SERPL-MCNC: <5 NG/ML
NICOTINE SERPL-MCNC: <5 NG/ML

## 2024-02-12 ENCOUNTER — TELEPHONE (OUTPATIENT)
Dept: TRANSPLANT | Facility: HOSPITAL | Age: 54
End: 2024-02-12
Payer: MEDICARE

## 2024-02-13 LAB
HLA CLASS I AB SCREEN,FC: NORMAL
HLA CLASS II AB SCREEN,FC: NORMAL
HLA RESULTS: NORMAL

## 2024-02-15 ENCOUNTER — APPOINTMENT (OUTPATIENT)
Dept: TRANSPLANT | Facility: HOSPITAL | Age: 54
End: 2024-02-15
Payer: MEDICARE

## 2024-02-15 ENCOUNTER — TELEPHONE (OUTPATIENT)
Dept: TRANSPLANT | Facility: HOSPITAL | Age: 54
End: 2024-02-15

## 2024-02-15 ENCOUNTER — TELEPHONE (OUTPATIENT)
Dept: TRANSPLANT | Facility: HOSPITAL | Age: 54
End: 2024-02-15
Payer: MEDICARE

## 2024-02-15 ENCOUNTER — DOCUMENTATION (OUTPATIENT)
Dept: TRANSPLANT | Facility: HOSPITAL | Age: 54
End: 2024-02-15
Payer: MEDICARE

## 2024-02-15 NOTE — TELEPHONE ENCOUNTER
SW attempted to reach Pt's secondary support via telephone call to confirm commitment. SW was unable to speak with Pt's secondary support and left VM with SW contact information.    Plan: SW to await return phone call.

## 2024-02-15 NOTE — PROGRESS NOTES
Spoke to pt on phone for virtual fin apt re benefits. Medicare part A/B active and primary to [place] PPO policy. Pharmacy coverage with [employer]. HD started June 2019. Pt understands AR meds will be covered by Medicare part B at 80%. Discussed donor bnfts. Discussed Medicare guidelines; emailed esrd guidelines. No insurance concerns at this time.

## 2024-02-28 LAB
FLOW AUTOCROSSMATCH: NORMAL
HLA RESULTS: NORMAL

## 2024-03-05 LAB
HLA CLS I TYP PNL BLD/T DONR HIGH RES: NORMAL
HLA RESULTS: NORMAL
HLA-DP2 QL: NORMAL
HLA-DQB1 HIGH RES: NORMAL
HLA-DRB1 HIGH RES: NORMAL

## 2024-03-06 ENCOUNTER — DOCUMENTATION (OUTPATIENT)
Dept: TRANSPLANT | Facility: HOSPITAL | Age: 54
End: 2024-03-06
Payer: MEDICARE

## 2024-03-06 NOTE — PROGRESS NOTES
"SW spoke with Pt's secondary support, Kole (Pt's ) via telephone call. SW confirmed Kole's identity. Kole shared that he has \"so much\" vacation time and will be able to take PTO from work, he has no other caregiver responsibilities, and no limitations that would prevent him from caring for Pt. Kole shared that he drives and has a working vehicle and is willing and committed to helping Pt throughout the transplant process. Kole denied any further questions/concerns at this time.     Plan: SW to follow original plan from 02/06/2024. SW to follow-up with Pt annually.   "

## 2024-03-08 ENCOUNTER — TELEPHONE (OUTPATIENT)
Dept: TRANSPLANT | Facility: HOSPITAL | Age: 54
End: 2024-03-08
Payer: MEDICARE

## 2024-03-08 NOTE — TELEPHONE ENCOUNTER
I returned the patient's call to get an update on her evaluation progress.  She wanted to know if she needs another colonoscopy.  Even though her last one was in 2017, the MD recommends to repeat every 10 years so she does not need another one yet.  We also reviewed the testing, consults, and blood work that she needs to get done.  Patient will do oxalate and ABO tests when she goes for her CT cardiac score.  Patient verbalized understanding and denied any further questions.

## 2024-03-11 ENCOUNTER — HOSPITAL ENCOUNTER (OUTPATIENT)
Dept: RADIOLOGY | Facility: EXTERNAL LOCATION | Age: 54
Discharge: HOME | End: 2024-03-11

## 2024-03-11 ENCOUNTER — HOSPITAL ENCOUNTER (OUTPATIENT)
Dept: HOSPITAL 100 - ED | Age: 54
Setting detail: OBSERVATION
LOS: 1 days | Discharge: HOME | End: 2024-03-12
Payer: MEDICARE

## 2024-03-11 VITALS
OXYGEN SATURATION: 95 % | SYSTOLIC BLOOD PRESSURE: 129 MMHG | TEMPERATURE: 98.2 F | DIASTOLIC BLOOD PRESSURE: 79 MMHG | HEART RATE: 94 BPM | RESPIRATION RATE: 18 BRPM

## 2024-03-11 VITALS
RESPIRATION RATE: 14 BRPM | OXYGEN SATURATION: 94 % | HEART RATE: 83 BPM | SYSTOLIC BLOOD PRESSURE: 153 MMHG | DIASTOLIC BLOOD PRESSURE: 89 MMHG

## 2024-03-11 VITALS — HEART RATE: 79 BPM | RESPIRATION RATE: 14 BRPM | DIASTOLIC BLOOD PRESSURE: 97 MMHG | SYSTOLIC BLOOD PRESSURE: 141 MMHG

## 2024-03-11 VITALS
HEART RATE: 86 BPM | DIASTOLIC BLOOD PRESSURE: 65 MMHG | RESPIRATION RATE: 19 BRPM | SYSTOLIC BLOOD PRESSURE: 110 MMHG | OXYGEN SATURATION: 98 %

## 2024-03-11 VITALS
OXYGEN SATURATION: 98 % | DIASTOLIC BLOOD PRESSURE: 78 MMHG | SYSTOLIC BLOOD PRESSURE: 119 MMHG | HEART RATE: 89 BPM | RESPIRATION RATE: 33 BRPM | TEMPERATURE: 98.1 F

## 2024-03-11 VITALS
RESPIRATION RATE: 16 BRPM | SYSTOLIC BLOOD PRESSURE: 143 MMHG | DIASTOLIC BLOOD PRESSURE: 80 MMHG | OXYGEN SATURATION: 93 % | HEART RATE: 82 BPM

## 2024-03-11 VITALS
RESPIRATION RATE: 14 BRPM | SYSTOLIC BLOOD PRESSURE: 141 MMHG | OXYGEN SATURATION: 93 % | HEART RATE: 90 BPM | DIASTOLIC BLOOD PRESSURE: 89 MMHG

## 2024-03-11 VITALS
SYSTOLIC BLOOD PRESSURE: 148 MMHG | HEART RATE: 99 BPM | DIASTOLIC BLOOD PRESSURE: 86 MMHG | OXYGEN SATURATION: 96 % | RESPIRATION RATE: 16 BRPM

## 2024-03-11 VITALS — RESPIRATION RATE: 18 BRPM | HEART RATE: 90 BPM

## 2024-03-11 VITALS — HEART RATE: 86 BPM | DIASTOLIC BLOOD PRESSURE: 79 MMHG | SYSTOLIC BLOOD PRESSURE: 154 MMHG | RESPIRATION RATE: 14 BRPM

## 2024-03-11 VITALS — SYSTOLIC BLOOD PRESSURE: 160 MMHG | HEART RATE: 84 BPM | RESPIRATION RATE: 16 BRPM | DIASTOLIC BLOOD PRESSURE: 91 MMHG

## 2024-03-11 VITALS
BODY MASS INDEX: 31.6 KG/M2 | OXYGEN SATURATION: 96 % | BODY MASS INDEX: 34.5 KG/M2 | BODY MASS INDEX: 34.9 KG/M2 | BODY MASS INDEX: 31.5 KG/M2 | BODY MASS INDEX: 35.1 KG/M2

## 2024-03-11 VITALS
DIASTOLIC BLOOD PRESSURE: 79 MMHG | HEART RATE: 92 BPM | OXYGEN SATURATION: 89 % | RESPIRATION RATE: 18 BRPM | TEMPERATURE: 97.52 F | SYSTOLIC BLOOD PRESSURE: 140 MMHG

## 2024-03-11 VITALS
OXYGEN SATURATION: 93 % | RESPIRATION RATE: 20 BRPM | DIASTOLIC BLOOD PRESSURE: 90 MMHG | SYSTOLIC BLOOD PRESSURE: 138 MMHG | HEART RATE: 80 BPM

## 2024-03-11 VITALS
SYSTOLIC BLOOD PRESSURE: 141 MMHG | HEART RATE: 89 BPM | DIASTOLIC BLOOD PRESSURE: 93 MMHG | OXYGEN SATURATION: 94 % | RESPIRATION RATE: 25 BRPM

## 2024-03-11 VITALS — OXYGEN SATURATION: 95 %

## 2024-03-11 VITALS — OXYGEN SATURATION: 93 %

## 2024-03-11 DIAGNOSIS — Z98.84: ICD-10-CM

## 2024-03-11 DIAGNOSIS — G47.33: ICD-10-CM

## 2024-03-11 DIAGNOSIS — Z87.891: ICD-10-CM

## 2024-03-11 DIAGNOSIS — E85.9: ICD-10-CM

## 2024-03-11 DIAGNOSIS — I13.0: ICD-10-CM

## 2024-03-11 DIAGNOSIS — I50.33: ICD-10-CM

## 2024-03-11 DIAGNOSIS — K21.9: ICD-10-CM

## 2024-03-11 DIAGNOSIS — Z99.2: ICD-10-CM

## 2024-03-11 DIAGNOSIS — Z79.890: ICD-10-CM

## 2024-03-11 DIAGNOSIS — F41.9: ICD-10-CM

## 2024-03-11 DIAGNOSIS — N18.6: ICD-10-CM

## 2024-03-11 DIAGNOSIS — E03.9: ICD-10-CM

## 2024-03-11 DIAGNOSIS — Z79.899: ICD-10-CM

## 2024-03-11 DIAGNOSIS — R09.02: ICD-10-CM

## 2024-03-11 DIAGNOSIS — D63.1: ICD-10-CM

## 2024-03-11 DIAGNOSIS — F32.A: ICD-10-CM

## 2024-03-11 DIAGNOSIS — J18.9: Primary | ICD-10-CM

## 2024-03-11 DIAGNOSIS — Z99.81: ICD-10-CM

## 2024-03-11 LAB
ANION GAP: 13 (ref 5–15)
BUN SERPL-MCNC: 61 MG/DL (ref 7–18)
BUN/CREAT RATIO: 7.3 RATIO (ref 10–20)
CALCIUM SERPL-MCNC: 10.4 MG/DL (ref 8.5–10.1)
CARBON DIOXIDE: 29 MMOL/L (ref 21–32)
CHLORIDE: 94 MMOL/L (ref 98–107)
DEPRECATED RDW RBC: 58.4 FL (ref 35.1–43.9)
ERYTHROCYTE [DISTWIDTH] IN BLOOD: 16.5 % (ref 11.6–14.6)
EST GLOM FILT RATE - AFR AMER: 6 ML/MIN (ref 60–?)
ESTIMATED CREATININE CLEARANCE: 7.81 ML/MIN
GLUCOSE: 113 MG/DL (ref 74–106)
HCT VFR BLD AUTO: 30.3 % (ref 37–47)
HGB BLD-MCNC: 9.5 G/DL (ref 12–15)
IMMATURE GRANULOCYTES COUNT: 0.07 X10^3/UL (ref 0–0)
MAGNESIUM: 2.8 MG/DL (ref 1.6–2.6)
MCV RBC: 95.6 FL (ref 81–99)
MEAN CORP HGB CONC: 31.4 G/DL (ref 32–36)
MEAN PLATELET VOL.: 9 FL (ref 6.2–12)
NRBC FLAGGED BY ANALYZER: 0 % (ref 0–5)
PLATELET # BLD: 345 K/MM3 (ref 150–450)
POTASSIUM: 4.5 MMOL/L (ref 3.5–5.1)
RBC # BLD AUTO: 3.17 M/MM3 (ref 4.2–5.4)
WBC # BLD AUTO: 11.6 K/MM3 (ref 4.4–11)

## 2024-03-11 PROCEDURE — 93306 TTE W/DOPPLER COMPLETE: CPT

## 2024-03-11 PROCEDURE — 93005 ELECTROCARDIOGRAM TRACING: CPT

## 2024-03-11 PROCEDURE — 80048 BASIC METABOLIC PNL TOTAL CA: CPT

## 2024-03-11 PROCEDURE — 85025 COMPLETE CBC W/AUTO DIFF WBC: CPT

## 2024-03-11 PROCEDURE — 84100 ASSAY OF PHOSPHORUS: CPT

## 2024-03-11 PROCEDURE — 96365 THER/PROPH/DIAG IV INF INIT: CPT

## 2024-03-11 PROCEDURE — 71046 X-RAY EXAM CHEST 2 VIEWS: CPT

## 2024-03-11 PROCEDURE — 87077 CULTURE AEROBIC IDENTIFY: CPT

## 2024-03-11 PROCEDURE — G0257 UNSCHED DIALYSIS ESRD PT HOS: HCPCS

## 2024-03-11 PROCEDURE — 99285 EMERGENCY DEPT VISIT HI MDM: CPT

## 2024-03-11 PROCEDURE — 94640 AIRWAY INHALATION TREATMENT: CPT

## 2024-03-11 PROCEDURE — 87633 RESP VIRUS 12-25 TARGETS: CPT

## 2024-03-11 PROCEDURE — 96368 THER/DIAG CONCURRENT INF: CPT

## 2024-03-11 PROCEDURE — 87205 SMEAR GRAM STAIN: CPT

## 2024-03-11 PROCEDURE — A4216 STERILE WATER/SALINE, 10 ML: HCPCS

## 2024-03-11 PROCEDURE — 87070 CULTURE OTHR SPECIMN AEROBIC: CPT

## 2024-03-11 PROCEDURE — 90937 HEMODIALYSIS REPEATED EVAL: CPT

## 2024-03-11 PROCEDURE — 83735 ASSAY OF MAGNESIUM: CPT

## 2024-03-11 PROCEDURE — G0378 HOSPITAL OBSERVATION PER HR: HCPCS

## 2024-03-11 PROCEDURE — 96366 THER/PROPH/DIAG IV INF ADDON: CPT

## 2024-03-11 PROCEDURE — 36415 COLL VENOUS BLD VENIPUNCTURE: CPT

## 2024-03-11 PROCEDURE — 87631 RESP VIRUS 3-5 TARGETS: CPT

## 2024-03-11 PROCEDURE — 99221 1ST HOSP IP/OBS SF/LOW 40: CPT

## 2024-03-11 PROCEDURE — 94668 MNPJ CHEST WALL SBSQ: CPT

## 2024-03-11 RX ADMIN — SODIUM CHLORIDE 1000 ML: 9 INJECTION, SOLUTION INTRAVENOUS at 15:35

## 2024-03-11 RX ADMIN — CEFTRIAXONE SODIUM 100 GM: 2 INJECTION, POWDER, FOR SOLUTION INTRAMUSCULAR; INTRAVENOUS at 10:37

## 2024-03-11 RX ADMIN — Medication 6 BAG: at 15:35

## 2024-03-11 RX ADMIN — AZITHROMYCIN DIHYDRATE 250 MG: 500 INJECTION, POWDER, LYOPHILIZED, FOR SOLUTION INTRAVENOUS at 10:48

## 2024-03-11 NOTE — PCM.HP.STD
HPI - General
General
Date of Admission: 24
Date of Service: 24
Chief Complaint: Shortness of breath acute on chronic for about 6 days
HPI Narrative
TIERA MCCULLOUGH, is a 53 F came to ED for acute on chronic shortness of breath and hypoxia slowly progressing over last 6 days.  She said she felt cough with productive phlegm, sinus headache and pressure started about a week ago and she went to 
urgent care where she was given Z-Aman and Mucinex and she completed from Wednesday to , full course.  She usually puts CPAP at night and has oxygen at home as needed.  She feels more short of breath today and hypoxia when she put oxygen in 
order to get relief but did not feel better therefore called EMS.  She is due for dialysis today but could not make it therefore came to ED.  No fever or chills.
In ED, chest x-ray individually reviewed and shows bilateral infiltrates worse on the left lung base with a small pleural effusion/increased interstitial markings/edema.  Twelve-lead EKG shows normal sinus rhythm at 90 bpm, LAE, QTc 467 ms.  No 
significant change from previous EKG of 2022.
Vitals reviewed.  Pulse ox 89% on room air otherwise within normal limit.






Formerly [facility], later [facility]
Medical History (Reviewed 24 @ 13:18 by Dr. Roc Johnson MD)

Amyloidosis
Amyloidosis
Anemia
Anemia, chronic disease
Anxiety and depression
Chronic anemia
CPAP (continuous positive airway pressure) dependence
Depression
Dialysis patient
End stage renal disease on dialysis
ESRD (end stage renal disease)
ESRD (end stage renal disease) on dialysis
Former smoker
Former tobacco use
GERD (gastroesophageal reflux disease)
Hypothyroidism
Kidney disease
Migraines
Morbid obesity
Morbid obesity due to excess calories
MARY (obstructive sleep apnea)
MARY on CPAP


Home Medications

citalopram 20 mg tablet 20 mg PO DAILY Depression 18 [History Last Taken 03/10/24]
cinacalcet 60 mg tablet (Sensipar) 60 mg PO DAILY 21 [History Last Taken 24]
cholecalciferol (vitamin D3) 25 mcg (1,000 unit) capsule 100 mcg PO DAILY 22 [History Last Taken 03/10/24]
albuterol sulfate 90 mcg/actuation aerosol inhaler 2 puff inhalation Q6H PRN shortness of breath or wheezing #8.5 grams 23 [Rx Last Taken 24]
levothyroxine 50 mcg tablet 50 mcg PO DAILY 23 [History Last Taken 24]
amlodipine 10 mg tablet 10 mg PO DAILY 24 [History Last Taken 03/10/24]
lanthanum 1,000 mg chewable tablet 1,000 mg PO TID 24 [History Last Taken 03/10/24]
vitamin B complex and vitamin C no.20-folic acid 1 mg capsule (Renal Caps) 1 cap PO DAILY 24 [History Last Taken 03/10/24]
zinc acetate 50 mg (zinc) capsule 50 mg PO DAILY 24 [History Last Taken 24]




Allergy/AdvReac Type Severity Reaction Status Date / Time
No Known Allergies Allergy   Verified 24 07:33


Family History (Reviewed 24 @ 13:18 by Dr. Roc Johnson MD)
Mother
 Dementia
 Leukemia
Father
 Heart disease
 Kidney disease


Surgical History (Reviewed 24 @ 13:18 by Dr. Roc Johnson MD)

History of cholecystectomy
History of Susan-en-Y gastric bypass
Hx of  section
Stem cells transplant status


Social History (Reviewed 24 @ 13:18 by Dr. Roc Johnson MD)
household members:  family 
Smoking Status:  Former smoker 
alcohol intake:  never 
substance use type:  does not use 



ROS
ROS Narrative
Constitutional: Reports fatigue and weakness.  No fever.
HEENT: Reports systems reviewed and no addt'l complaints, except as documented
Respiratory/Chest: As described in HPI.  Mild chest congestion but no chest pain or pressure.
CVS: No history of coronary artery disease.
Gastrointestinal: Denies coffee ground emesis, hematemesis or vomiting
Genitourinary: Denies burning urination or new urinary tract symptoms
Musculoskeletal: Denies acute joint pain or limited range of motion.  No acute injury
Neurologic: Denies seizure-like symptoms.
skin: No ulcer.  No rash
Endocrinology: Reports systems reviewed and no addt'l complaints, except as documented
Hematologic/Lymphatic: Reports systems reviewed and no addt'l complaints, except as documented
Rest 14 ROS are negative except as mentioned in HPI

Vital Signs
Vital Signs
Vital Signs: 


 24
07:33 24
08:01 24
07:32
Temperature 97.6 F L  
Temperature Source Temporal  
Pulse Rate 92  
Respiratory Rate 18  
Respiratory Effort   Normal
Respiratory Depth   Normal
Respiratory Pattern   Normal
Blood Pressure 140/79 H  
Blood Pressure Mean 99  
Pulse Ox 89  
Oxygen Delivery Method Room Air Room Air Nasal Cannula
Oxygen Flow Rate (L/min)   2

 24
07:32 24
09:32
Temperature  
Temperature Source  
Pulse Rate  99
Respiratory Rate  16
Respiratory Effort  
Respiratory Depth  
Respiratory Pattern  
Blood Pressure  148/86 H
Blood Pressure Mean  106
Pulse Ox 97 96
Oxygen Delivery Method Nasal Cannula Nasal Cannula
Oxygen Flow Rate (L/min) 2 2

Weight

Weight:                        190 lb 11.198 oz                                  
Body Mass Index (BMI)          34.9                                              




Physical Exam
Narrative
Seen and examined.
General: Alert, Oriented x3, Cooperative
HEENT: Atraumatic, PERRLA, EOMI, Normocephalic
Oral: No Gingival or Mucosal Lesions/ Ulcerations
Neck: Supple, No JVD, Negative Carotid Bruits
Chest wall/Lungs:  Air entry diminished in bilateral lung bases.  Mild bilateral expiratory rhonchi.  Oral mucosa dry. 
Cardiovascular: Regular rate, Regular Rhythm, Normal S1, Normal S2, systolic murmur over LLSB and right second ICS.
Abdomen: Bowel Sounds Present, Soft, Non Tender, Non-Distended
:Left arm AV fistula.  Functioning.  Baseline patient is an uric.  Does not make urine.  No renal angle tenderness.  No suprapubic tenderness.
Extremities: .  No edema, Capillary Refill Less than 3 Seconds
Skin: No rashes, No breakdown
Musculoskeletal: No Tenderness to Palpation of Joints or Extremities
Neurological: Cranial nerves II-XII grossly intact, DTR  2+/4.  No acute focal neurological deficit.
Psych/Mental Status: Flat affect.

Results
Lab / Micro Data

24 08:23          

24 08:23          

Labs: 
Laboratory Results - last 24 hr

24 08:23: WBC 11.6 H, RBC 3.17 L, Hgb 9.5 L, Hct 30.3 L, MCV 95.6, MCH 30.0, MCHC 31.4 L, RDW Std Deviation 58.4 H, RDW Coeff of Mihir 16.5 H, Plt Count 345, MPV 9.0, Immature Gran % (Auto) 0.600, Neut % (Auto) 83.6 H, Lymph % (Auto) 7.2 L, Mono 
% (Auto) 6.6, Eos % (Auto) 1.7, Baso % (Auto) 0.3, Absolute Neuts (auto) 9.7 H, Absolute Lymphs (auto) 0.83, Nucleated RBC % 0, Sodium 136, Potassium 4.5, Chloride 94 L, Carbon Dioxide 29.0, Anion Gap 13, BUN 61 H, Creatinine 8.41 H*, Estim Creat 
Clear Calc 7.81, Est GFR (MDRD) Af Amer 6 L, Est GFR (MDRD) Non-Af 5 L, BUN/Creatinine Ratio 7.3 L, Glucose 113 H, Calcium 10.4 H


Micro: 
Microbiology

24 08:23   Mucosa - Nasopharyngeal   SARS-CoV-2, Influenza & RSV (PCR) - Final


Rhythm Strip
Rhythm Strip: Sinus Rhythm
Rate: 90
Ectopy: None
Imaging
Radiology Impression

Chest X-Ray  24 07:59
IMPRESSION:
Bilateral infiltrations worse at the left lung base with small bilateral
pleural effusions superimposed on CHF.
 
Electronically Signed:
Harrison Klein MD
 at 10:27 EDT
Reading Location ID and State: 603 / [place]
Tel [phone], Service support  [phone], Fax [phone]
 




Assessment & Plan
Assessment/Plan
(1) Bilateral pneumonia: 
QUALIFIERS:               Pneumonia type: due to unspecified organism  Lung location: lower lobe of lung  Qualified Code(s): J18.9 - Pneumonia, unspecified organism
PLAN: 
Plan
This is a 53-year-old female being admitted for acute on chronic shortness of breath and URI symptoms and chest x-ray finding suggestive of bilateral lung bases pneumonia.

1.  Mild bilateral pneumonia: Patient is being admitted in PCU.  Flu COVID and RSV PCR are negative.  Pneumonia workup with a sputum culture and respiratory panel ordered.  DuoNeb as needed.  Patient started on IV ceftriaxone and azithromycin.  Mild 
leukocytosis.  Patient was last admitted in 2023 for right lower lobe pneumonia.

2.  ESRD on hemodialysis : She is due for hemodialysis today but could not make it to the dialysis center because of hypoxia and shortness of breath.  Nephrologist is consulted.

3.  Mild acute on chronic HFpEF with suspicion of mild pulmonary venous congestion/small pleural effusion: It seems patient has chronic HFpEF but she is unaware.  Last echo in 2019 shows EF 55%, stage II diastolic dysfunction, no RWMA 1-2+ 
TR.  PASP 42 mmHg.Patient had CATRACHITO on 2019 and EF 65% reported with no obvious vegetation.   repeat echo today

4.  Anemia of chronic disease due to ESRD: Hemoglobin is on the baseline H&H 9.5/30%.

5.  Amyloidosis: Patient s/p stem cell transplant in remission, encourage continued outpatient follow-up.

6   Anxiety and depression: continue patient home citalopram regimen.

7.  Hypertension: Blood pressure profile is better after Susan-en-Y gastric bypass surgery.  Patient was on  on nifedipine and propranolol, but all of these medications after gastric bypass currently only on amlodipine 10 mg daily.  Resumed. 

8.  Multiple other comorbidities include dyslipidemia and GERD, obstructive sleep apnea on CPAP 13 cm of water and hypothyroidism.  Patient follows in pulmonary clinic with Dr. Larry.  TSH and free T4 ordered.
DVT prophylaxis: Heparin 2020 subcutaneous 3 times daily.  Discontinue if platelet count drops less than 50,000 or hemoglobin less than 8 g%


Living will/advanced directive/end of life care: Patient not have living will or advanced directive.   near the bedside in ED is power of  for health.  After discussion of benefits/risks procedures involved with  full code, DNR CC 
arrest and DNR CC, the patient opted for full code.
Patient  does want artificial life support including intubation, tube feed, ventilator and/chest compression, central venous catheter, vasopressor and DC shock if needed
   Total time spent in face-to-face encounter in discussion of advanced directive 17 minutes.

Microbiology Past 72 Hours

24 08:23   Mucosa - Nasopharyngeal   SARS-CoV-2, Influenza & RSV (PCR) - Final

Laboratory Results

24 08:23: WBC 11.6 H, RBC 3.17 L, Hgb 9.5 L, Hct 30.3 L, MCV 95.6, MCH 30.0, MCHC 31.4 L, RDW Std Deviation 58.4 H, RDW Coeff of Mihir 16.5 H, Plt Count 345, MPV 9.0, Immature Gran % (Auto) 0.600, Neut % (Auto) 83.6 H, Lymph % (Auto) 7.2 L, Mono 
% (Auto) 6.6, Eos % (Auto) 1.7, Baso % (Auto) 0.3, Absolute Neuts (auto) 9.7 H, Absolute Lymphs (auto) 0.83, Nucleated RBC % 0, Sodium 136, Potassium 4.5, Chloride 94 L, Carbon Dioxide 29.0, Anion Gap 13, BUN 61 H, Creatinine 8.41 H*, Estim Creat 
Clear Calc 7.81, Est GFR (MDRD) Af Amer 6 L, Est GFR (MDRD) Non-Af 5 L, BUN/Creatinine Ratio 7.3 L, Glucose 113 H, Calcium 10.4 H, Phosphorus 8.5 H, Magnesium 2.8 H


Clinical Impression(s) from Imaging Studies

Chest X-Ray  24 07:59
IMPRESSION:
Bilateral infiltrations worse at the left lung base with small bilateral
pleural effusions superimposed on CHF.
 
Electronically Signed:
Harrison Klein MD
 at 10:27 EDT
Reading Location ID and State: [address] / [place]
Tel [phone], Service support  [phone], Fax [phone]
 




Charges/Coding
Visit Charges
Inpatient E&M: 27204 Init Hosp L3
Procedures Hospitalists
Procedures: 93450 Advncd Care Plan 30 Min

## 2024-03-11 NOTE — HP.PCM.HOS_ITS
HPI - General    
General    
Date of Admission: 24    
Date of Service: 24    
Chief Complaint: Shortness of breath acute on chronic for about 6 days    
HPI Narrative    
TIERA MCCULLOUGH, is a 53 F came to ED for acute on chronic shortness of breath a  
nd hypoxia slowly progressing over last 6 days.  She said she felt cough with   
productive phlegm, sinus headache and pressure started about a week ago and she   
went to urgent care where she was given Z-Aman and Mucinex and she completed from  
Wednesday to , full course.  She usually puts CPAP at night and has oxygen  
at home as needed.  She feels more short of breath today and hypoxia when she pu  
t oxygen in order to get relief but did not feel better therefore called EMS.    
She is due for dialysis today but could not make it therefore came to ED.  No   
fever or chills.    
In ED, chest x-ray individually reviewed and shows bilateral infiltrates worse   
on the left lung base with a small pleural effusion/increased interstitial   
markings/edema.  Twelve-lead EKG shows normal sinus rhythm at 90 bpm, LAE, QTc   
467 ms.  No significant change from previous EKG of 2022.    
Vitals reviewed.  Pulse ox 89% on room air otherwise within normal limit.    
    
    
    
    
    
    
[facility]    
Medical History (Reviewed 24 @ 13:18 by Dr. Roc Johnson MD)    
    
Amyloidosis    
Amyloidosis    
Anemia    
Anemia, chronic disease    
Anxiety and depression    
Chronic anemia    
CPAP (continuous positive airway pressure) dependence    
Depression    
Dialysis patient    
End stage renal disease on dialysis    
ESRD (end stage renal disease)    
ESRD (end stage renal disease) on dialysis    
Former smoker    
Former tobacco use    
GERD (gastroesophageal reflux disease)    
Hypothyroidism    
Kidney disease    
Migraines    
Morbid obesity    
Morbid obesity due to excess calories    
MARY (obstructive sleep apnea)    
MARY on CPAP    
    
    
                                Home Medications    
    
citalopram 20 mg tablet 20 mg PO DAILY Depression 18 [History Last Taken   
03/10/24]    
cinacalcet 60 mg tablet (Sensipar) 60 mg PO DAILY 21 [History Last Taken   
24]    
cholecalciferol (vitamin D3) 25 mcg (1,000 unit) capsule 100 mcg PO DAILY   
22 [History Last Taken 03/10/24]    
albuterol sulfate 90 mcg/actuation aerosol inhaler 2 puff inhalation Q6H PRN   
shortness of breath or wheezing #8.5 grams 02/25/23 [Rx Last Taken 24]    
levothyroxine 50 mcg tablet 50 mcg PO DAILY 23 [History Last Taken   
24]    
amlodipine 10 mg tablet 10 mg PO DAILY 24 [History Last Taken 03/10/24]    
lanthanum 1,000 mg chewable tablet 1,000 mg PO TID 24 [History Last Taken   
03/10/24]    
vitamin B complex and vitamin C no.20-folic acid 1 mg capsule (Renal Caps) 1 cap  
PO DAILY 24 [History Last Taken 03/10/24]    
zinc acetate 50 mg (zinc) capsule 50 mg PO DAILY 24 [History Last Taken   
24]    
    
    
                                            
    
    
    
Allergy/AdvReac Type Severity Reaction Status Date / Time    
     
No Known Allergies Allergy   Verified 24 07:33    
    
    
    
Family History (Reviewed 24 @ 13:18 by Dr. Roc Johnson MD)    
Mother   Dementia    
   Leukemia    
Father   Heart disease    
   Kidney disease    
    
    
Surgical History (Reviewed 24 @ 13:18 by Dr. Roc Johnson MD)    
    
History of cholecystectomy    
History of Susan-en-Y gastric bypass    
Hx of  section    
Stem cells transplant status    
    
    
Social History (Reviewed 24 @ 13:18 by Dr. Roc Johnson MD)    
household members:  family     
Smoking Status:  Former smoker     
alcohol intake:  never     
substance use type:  does not use     
    
    
    
ROS    
ROS Narrative    
Constitutional: Reports fatigue and weakness.  No fever.    
HEENT: Reports systems reviewed and no addt'l complaints, except as documented    
Respiratory/Chest: As described in HPI.  Mild chest congestion but no chest pain  
or pressure.    
CVS: No history of coronary artery disease.    
Gastrointestinal: Denies coffee ground emesis, hematemesis or vomiting    
Genitourinary: Denies burning urination or new urinary tract symptoms    
Musculoskeletal: Denies acute joint pain or limited range of motion.  No acute   
injury    
Neurologic: Denies seizure-like symptoms.    
skin: No ulcer.  No rash    
Endocrinology: Reports systems reviewed and no addt'l complaints, except as   
documented    
Hematologic/Lymphatic: Reports systems reviewed and no addt'l complaints, except  
as documented    
Rest 14 ROS are negative except as mentioned in HPI    
    
Vital Signs    
Vital Signs    
Vital Signs:     
                                            
    
    
    
 24    
07:33 24    
08:01 24    
07:32    
     
Temperature 97.6 F L      
     
Temperature Source Temporal      
     
Pulse Rate 92      
     
Respiratory Rate 18      
     
Respiratory Effort   Normal    
     
Respiratory Depth   Normal    
     
Respiratory Pattern   Normal    
     
Blood Pressure 140/79 H      
     
Blood Pressure Mean 99      
     
Pulse Ox 89      
     
Oxygen Delivery Method Room Air Room Air Nasal Cannula    
     
Oxygen Flow Rate (L/min)   2    
    
    
    
    
 24    
07:32 24    
09:32    
     
Temperature      
     
Temperature Source      
     
Pulse Rate  99    
     
Respiratory Rate  16    
     
Respiratory Effort      
     
Respiratory Depth      
     
Respiratory Pattern      
     
Blood Pressure  148/86 H    
     
Blood Pressure Mean  106    
     
Pulse Ox 97 96    
     
Oxygen Delivery Method Nasal Cannula Nasal Cannula    
     
Oxygen Flow Rate (L/min) 2 2    
    
    
                                     Weight    
    
    
    
Weight:                        190 lb 11.198 oz                                   
    
     
Body Mass Index (BMI)          34.9                                               
    
    
    
    
    
    
Physical Exam    
Narrative    
Seen and examined.    
General: Alert, Oriented x3, Cooperative    
HEENT: Atraumatic, PERRLA, EOMI, Normocephalic    
Oral: No Gingival or Mucosal Lesions/ Ulcerations    
Neck: Supple, No JVD, Negative Carotid Bruits    
Chest wall/Lungs:  Air entry diminished in bilateral lung bases.  Mild bilateral  
expiratory rhonchi.  Oral mucosa dry.     
Cardiovascular: Regular rate, Regular Rhythm, Normal S1, Normal S2, systolic   
murmur over LLSB and right second ICS.    
Abdomen: Bowel Sounds Present, Soft, Non Tender, Non-Distended    
:Left arm AV fistula.  Functioning.  Baseline patient is an uric.  Does not   
make urine.  No renal angle tenderness.  No suprapubic tenderness.    
Extremities: .  No edema, Capillary Refill Less than 3 Seconds    
Skin: No rashes, No breakdown    
Musculoskeletal: No Tenderness to Palpation of Joints or Extremities    
Neurological: Cranial nerves II-XII grossly intact, DTR  2+/4.  No acute focal   
neurological deficit.    
Psych/Mental Status: Flat affect.    
    
Results    
Lab / Micro Data    
    
                                                        24 08:23              
    
                                                        24 08:23              
    
Labs:     
                         Laboratory Results - last 24 hr    
    
24 08:23: WBC 11.6 H, RBC 3.17 L, Hgb 9.5 L, Hct 30.3 L, MCV 95.6, MCH   
30.0, MCHC 31.4 L, RDW Std Deviation 58.4 H, RDW Coeff of Mihir 16.5 H, Plt Count   
345, MPV 9.0, Immature Gran % (Auto) 0.600, Neut % (Auto) 83.6 H, Lymph % (Auto)  
7.2 L, Mono % (Auto) 6.6, Eos % (Auto) 1.7, Baso % (Auto) 0.3, Absolute Neuts   
(auto) 9.7 H, Absolute Lymphs (auto) 0.83, Nucleated RBC % 0, Sodium 136,   
Potassium 4.5, Chloride 94 L, Carbon Dioxide 29.0, Anion Gap 13, BUN 61 H,   
Creatinine 8.41 H*, Estim Creat Clear Calc 7.81, Est GFR (MDRD) Af Amer 6 L, Est  
GFR (MDRD) Non-Af 5 L, BUN/Creatinine Ratio 7.3 L, Glucose 113 H, Calcium 10.4 H    
    
    
Micro:     
                                  Microbiology    
    
24 08:23   Mucosa - Nasopharyngeal   SARS-CoV-2, Influenza & RSV (PCR) -   
Final    
    
    
Rhythm Strip    
Rhythm Strip: Sinus Rhythm    
Rate: 90    
Ectopy: None    
Imaging    
                              Radiology Impression    
    
Chest X-Ray  24 07:59    
IMPRESSION:    
Bilateral infiltrations worse at the left lung base with small bilateral    
pleural effusions superimposed on CHF.    
     
Electronically Signed:    
Harrison Klein MD    
 at 10:27 EDT    
Reading Location ID and State: 603 / [place]    
Tel [phone], Service support  [phone], Fax [phone]    
     
    
    
    
    
Assessment & Plan    
Assessment/Plan    
(1) Bilateral pneumonia:     
QUALIFIERS:               Pneumonia type: due to unspecified organism  Lung   
location: lower lobe of lung  Qualified Code(s): J18.9 - Pneumonia, unspecified   
organism    
PLAN:     
Plan    
This is a 53-year-old female being admitted for acute on chronic shortness of   
breath and URI symptoms and chest x-ray finding suggestive of bilateral lung   
bases pneumonia.    
    
1.  Mild bilateral pneumonia: Patient is being admitted in PCU.  Flu COVID and   
RSV PCR are negative.  Pneumonia workup with a sputum culture and respiratory   
panel ordered.  DuoNeb as needed.  Patient started on IV ceftriaxone and   
azithromycin.  Mild leukocytosis.  Patient was last admitted in 2023   
for right lower lobe pneumonia.    
    
2.  ESRD on hemodialysis : She is due for hemodialysis today but could not make   
it to the dialysis center because of hypoxia and shortness of breath.    
Nephrologist is consulted.    
    
3.  Mild acute on chronic HFpEF with suspicion of mild pulmonary venous   
congestion/small pleural effusion: It seems patient has chronic HFpEF but she is  
unaware.  Last echo in 2019 shows EF 55%, stage II diastolic   
dysfunction, no RWMA 1-2+ TR.  PASP 42 mmHg.Patient had CATRACHITO on 2019 and  
EF 65% reported with no obvious vegetation.   repeat echo today    
    
4.  Anemia of chronic disease due to ESRD: Hemoglobin is on the baseline H&H   
9.5/30%.    
    
5.  Amyloidosis: Patient s/p stem cell transplant in remission, encourage   
continued outpatient follow-up.    
    
6   Anxiety and depression: continue patient home citalopram regimen.    
    
7.  Hypertension: Blood pressure profile is better after Susan-en-Y gastric   
bypass surgery.  Patient was on  on nifedipine and propranolol, but all of these  
medications after gastric bypass currently only on amlodipine 10 mg daily.    
Resumed.     
    
8.  Multiple other comorbidities include dyslipidemia and GERD, obstructive   
sleep apnea on CPAP 13 cm of water and hypothyroidism.  Patient follows in   
pulmonary clinic with Dr. Larry.  TSH and free T4 ordered.    
DVT prophylaxis: Heparin 2020 subcutaneous 3 times daily.  Discontinue if   
platelet count drops less than 50,000 or hemoglobin less than 8 g%    
    
    
Living will/advanced directive/end of life care: Patient not have living will or  
advanced directive.   near the bedside in ED is power of  for   
health.  After discussion of benefits/risks procedures involved with  full code,  
DNR CC arrest and DNR CC, the patient opted for full code.    
Patient  does want artificial life support including intubation, tube feed,   
ventilator and/chest compression, central venous catheter, vasopressor and DC   
shock if needed    
   Total time spent in face-to-face encounter in discussion of advanced   
directive 17 minutes.    
    
                           Microbiology Past 72 Hours    
    
24 08:23   Mucosa - Nasopharyngeal   SARS-CoV-2, Influenza & RSV (PCR) -   
Final    
    
                               Laboratory Results    
    
24 08:23: WBC 11.6 H, RBC 3.17 L, Hgb 9.5 L, Hct 30.3 L, MCV 95.6, MCH   
30.0, MCHC 31.4 L, RDW Std Deviation 58.4 H, RDW Coeff of Mihir 16.5 H, Plt Count   
345, MPV 9.0, Immature Gran % (Auto) 0.600, Neut % (Auto) 83.6 H, Lymph % (Auto)  
7.2 L, Mono % (Auto) 6.6, Eos % (Auto) 1.7, Baso % (Auto) 0.3, Absolute Neuts   
(auto) 9.7 H, Absolute Lymphs (auto) 0.83, Nucleated RBC % 0, Sodium 136,   
Potassium 4.5, Chloride 94 L, Carbon Dioxide 29.0, Anion Gap 13, BUN 61 H,   
Creatinine 8.41 H*, Estim Creat Clear Calc 7.81, Est GFR (MDRD) Af Amer 6 L, Est  
GFR (MDRD) Non-Af 5 L, BUN/Creatinine Ratio 7.3 L, Glucose 113 H, Calcium 10.4 H  
, Phosphorus 8.5 H, Magnesium 2.8 H    
    
    
Clinical Impression(s) from Imaging Studies    
    
Chest X-Ray  24 07:59    
IMPRESSION:    
Bilateral infiltrations worse at the left lung base with small bilateral    
pleural effusions superimposed on CHF.    
     
Electronically Signed:    
Harrison Klein MD    
 at 10:27 EDT    
Reading Location ID and State: [address] / [place]    
Tel [phone], Service support  [phone], Fax [phone]    
     
    
    
    
    
Charges/Coding    
Visit Charges    
Inpatient E&M: 07281 Init Hosp L3    
Procedures Hospitalists    
Procedures: 69215 Advncd Care Plan 30 Min

## 2024-03-11 NOTE — ECHOD_ITS
Reason For Study: PULMONARY EDEMA 
 
Procedure 
This was a 2D Doppler, Color Flow transthoracic echocardiogram. Exam performed portable in patient 
room. 
 
Left Ventricle 
Normal LV size. The estimated ejection fraction is 70 %. Unable to assess diastolic dysfunction. No 
regional wall motion abnormalities noted. 
 
Right Ventricle 
Normal right ventricle. Normal systolic function. 
 
Atria 
The left atrium is moderately enlarged. Normal right atrium. No doppler evidence for ASD. 
 
Mitral Valve 
There is severe mitral annular calcification. Severe mitral valve stenosis. Mild (1+) mitral valve 
insufficiency. 
 
Tricuspid Valve 
There is no tricuspid stenosis. Trivial tricuspid valve insufficiency. Pulmonary artery systolic 
pressure is 90 mmHg. Severe pulmonary hypertension. 
 
Aortic Valve 
Trisinus/trileaflet aortic valve. There is no aortic stenosis. Mild-Moderate (1-2+) aortic valve 
insufficiency. 
 
Pulmonic Valve 
There is no pulmonic valvular stenosis. No pulmonic valve insufficiency. 
 
Great Vessels 
Normal aortic root. 
 
Pericardium/Pleural 
No pericardial effusion. 
 
MMode/2D Measurements & Calculations 
LVIDd: 4.5 cm                   IVSd: 1.5 cm                            LVOT diam: 2.0 cm 
LVIDs: 2.6 cm                   LVPWd: 1.4 cm                           LVOT area: 3.1 cm2 
RVDd: 3.6 cm                    FS: 41.7 % 
          _________________________________________________________________________________ 
Ao root diam: 2.9 cm            LAV(MOD-bp): 84.4 ml                    LVAd ap4: 29.8 cm2 
                                LAV(MOD-bp) Indexed: 45.1 ml/m2         LVLd ap4: 8.3 cm 
                                LAV(MOD-sp2): 84.6 ml                   EDV(MOD-sp4): 88.3 ml 
                                LAV(MOD-sp4): 85.5 ml                   EDV(sp4-el): 90.7 ml 
 
                                                                        LVAs ap4: 14.5 cm2 
                                                                        LVLs ap4: 6.6 cm 
                                                                        ESV(MOD-sp4): 26.8 ml 
                                                                        ESV(sp4-el): 26.9 ml 
                                                                        EF(MOD-sp4): 69.7 % 
                                                                        EF(sp4-el): 70.4 % 
          _________________________________________________________________________________ 
SV(MOD-sp4): 61.5 ml            SV(sp4-el): 63.8 ml                     LA A4 area: 26.5 cm2 
          _________________________________________________________________________________ 
LA dimension(2D): 4.9 cm        RA A4 area: 13.6 cm2                    TAPSE: 1.9 cm 
 
Time Measurements 
MV dec time: 0.38 sec 
 
Doppler Measurements & Calculations 
MV E max taye: 224.8 cm/sec       Lat Peak E' Taye: 8.7 cm/sec      Med Peak E' Taye: 6.5 cm/sec 
MV A max taye: 203.3 cm/sec       E/E' lat: 25.8                   E/E' med: 34.7 
MV E/A: 1.1 
          _________________________________________________________________________________ 
MV V2 max: 369.3 cm/sec          Ao V2 max: 331.6 cm/sec          AI max taye: 489.6 cm/sec 
MV max P.6 mmHg             Ao max P.0 mmHg             AI max P.9 mmHg 
MV V2 mean: 249.4 cm/sec         Ao V2 mean: 241.6 cm/sec 
MV mean P.1 mmHg            Ao mean P.9 mmHg            AI dec slope: 539.5 cm/sec2 
MV V2 VTI: 87.8 cm               Ao V2 VTI: 57.0 cm               AI P1/2t: 265.8 msec 
                                 AV (velocity ratio): 0.53 
MVA(VTI): 1.1 cm2 
                                 SERENA(I,D): 1.6 cm2 
                                 SERENA(V,D): 1.5 cm2 
 
          _________________________________________________________________________________ 
LV V1 max: 159.5 cm/sec          SV(LVOT): 93.8 ml                PA V2 max: 118.5 cm/sec 
LV V1 max PG: 10.2 mmHg 
LV V1 mean P.7 mmHg 
LV V1 mean: 122.7 cm/sec 
LV V1 VTI: 30.4 cm 
 
          _________________________________________________________________________________ 
TR max taye: 456.2 cm/sec 
TR max P.2 mmHg 
 
ORDER #: [id number] ECHO/Echo Complete  
Interpretation Summary  
The estimated ejection fraction is 70 %.  
Unable to assess diastolic dysfunction.  
The left atrium is moderately enlarged.  
Severe mitral valve stenosis.  
Severe pulmonary hypertension.  
Mild-Moderate (1-2+) aortic valve insufficiency.  
Mild (1+) mitral valve insufficiency.  
 
  
 
  
______________________________________________________________________________  
Ordering Physician: Roc Johnson  
Referring Physician: BISHOP GONSALVES  
Performed By: Ashli Cruz RDCS  
Electronically signed by: Marie Riley MD on 2024 03:13 PM

## 2024-03-11 NOTE — EDS_ITS
HPI    
History of Present Illness    
Chief Complaint: Shortness of Breath    
Informant: patient and EMS    
Narrative    
Narrative:     
Patient is a 53-year-old female with history of end-stage renal disease on   
hemodialysis (does not wear oxygen), MARY and intermittent hypoxia (states she   
wears oxygen at home sometimes whenever she is sick or has a cold) presenting   
from hemodialysis for concern of hypoxia.  Patient apparently arrived today and   
was 62% on room air.  Dialysis was concerned and called 911.  Patient was placed  
on 3 L and given a DuoNeb by EMS and came up to 96%.  She notes that she started  
wearing oxygen at home yesterday because she just could not breathe right.    
States that she has been feeling more short of breath and has some chest   
congestion for the past few days.  Yesterday developed some mild nasal   
congestion and sore throat.  Has her normal ankles well with no acute change.    
Denies any fever.  Has a cough but it has been nonproductive however she feels   
that there is mucus in there.  She is been taking Mucinex with no relief of   
this.  Denies any cyst abdominal pain, nausea, vomiting or diarrhea.  Does not   
make urine at baseline.  Denies any sick contacts.  Last had hemodialysis on   
Friday (regularly goes ).  Notes that she did not have   
any oxygen for travel to dialysis today and had to get gas before coming to   
dialysis as well.  Denies any associated chest pain.    
    
[facility]    
Medical History (Reviewed 24 @ 08:29 by Dr. Mai Vázquez, )    
    
Amyloidosis    
Amyloidosis    
Anemia    
Anemia, chronic disease    
Anxiety and depression    
Chronic anemia    
CPAP (continuous positive airway pressure) dependence    
Depression    
Dialysis patient    
End stage renal disease on dialysis    
ESRD (end stage renal disease)    
ESRD (end stage renal disease) on dialysis    
Former smoker    
Former tobacco use    
GERD (gastroesophageal reflux disease)    
Hypothyroidism    
Kidney disease    
Migraines    
Morbid obesity    
Morbid obesity due to excess calories    
MARY (obstructive sleep apnea)    
MARY on CPAP    
    
    
                                Home Medications    
    
citalopram 20 mg tablet 20 mg PO DAILY Depression 18 [History Last Taken   
03/10/24]    
cinacalcet 60 mg tablet (Sensipar) 60 mg PO DAILY 21 [History Last Taken   
24]    
cholecalciferol (vitamin D3) 25 mcg (1,000 unit) capsule 100 mcg PO DAILY   
22 [History Last Taken 03/10/24]    
albuterol sulfate 90 mcg/actuation aerosol inhaler 2 puff inhalation Q6H PRN   
shortness of breath or wheezing #8.5 grams 23 [Rx Last Taken 24]    
levothyroxine 50 mcg tablet 50 mcg PO DAILY 23 [History Last Taken   
24]    
amlodipine 10 mg tablet 10 mg PO DAILY 24 [History Last Taken 03/10/24]    
lanthanum 1,000 mg chewable tablet 1,000 mg PO TID 24 [History Last Taken   
03/10/24]    
vitamin B complex and vitamin C no.20-folic acid 1 mg capsule (Renal Caps) 1 cap  
PO DAILY 24 [History Last Taken 03/10/24]    
zinc acetate 50 mg (zinc) capsule 50 mg PO DAILY 24 [History Last Taken   
24]    
    
    
                                            
    
    
    
Allergy/AdvReac Type Severity Reaction Status Date / Time    
     
No Known Allergies Allergy   Verified 24 07:33    
    
    
    
Family History (Reviewed 24 @ 08:29 by Dr. Mai Vázquez DO)    
Mother   Dementia    
   Leukemia    
Father   Heart disease    
   Kidney disease    
    
    
Surgical History (Reviewed 24 @ 08:29 by Dr. Mai Vázquez DO)    
    
History of cholecystectomy    
History of Susan-en-Y gastric bypass    
Hx of  section    
Stem cells transplant status    
    
    
Social History (Reviewed 24 @ 08:29 by Dr. Mai Vázquez DO)    
household members:  family     
Smoking Status:  Former smoker     
alcohol intake:  never     
substance use type:  does not use     
    
    
    
ROS    
ROS ED    
Constitutional    
Constitutional ED: Denies chills or fever(s)    
ENT    
ENT ED: Reports sore throat and other Details: nasal congetsion ;     
Denies ear pain    
Cardiovascular    
Cardiovascular: Denies chest pain    
Respiratory/Chest    
Respiratory/Chest: Reports cough, dyspnea and dyspnea on exertion; Denies sputum    
Gastrointestinal    
Gastrointestinal: Denies abdominal pain, diarrhea, nausea or vomiting    
Genitourinary    
Genitourinary ED: Reports other Details: Anuric at baseline    
Musculoskeletal    
Musculoskeletal: Denies arthralgias or myalgias    
Integumentary    
Denies rash    
Neurologic    
Neurologic: Denies headache(s)    
Hematologic/Lymphatic    
Hematologic/Lymphatic: Denies easy bleeding or easy bruising    
    
EXAM    
Physical Exam    
Const    
Vital Signs:     
    
                                            
    
    
    
 24    
07:33 24    
08:01 24    
07:32    
     
Temperature 97.6 F L      
     
Temperature Source Temporal      
     
Pulse Rate 92      
     
Respiratory Rate 18      
     
Respiratory Effort   Normal    
     
Respiratory Depth   Normal    
     
Respiratory Pattern   Normal    
     
Blood Pressure 140/79 H      
     
Blood Pressure Mean 99      
     
Pulse Ox 89      
     
Oxygen Delivery Method Room Air Room Air Nasal Cannula    
     
Oxygen Flow Rate (L/min)   2    
    
    
    
    
 24    
07:32 24    
09:32 24    
11:30    
     
Temperature       
     
Temperature Source       
     
Pulse Rate  99 89    
     
Respiratory Rate  16 25 H    
     
Respiratory Effort       
     
Respiratory Depth       
     
Respiratory Pattern       
     
Blood Pressure  148/86 H 141/93 H    
     
Blood Pressure Mean  106 109    
     
Pulse Ox 97 96 94    
     
Oxygen Delivery Method Nasal Cannula Nasal Cannula Nasal Cannula    
     
Oxygen Flow Rate (L/min) 2 2     
    
    
    
    
Positive well nourished and well developed    
General Appearance ED: well developed and NAD; Negative for pallor    
HEENT    
Reports TM's clear and dry mucous membranes    
Tympanic Membrane ED: Yes TM's clear    
Mouth ED: Yes dry mucous membranes    
Mouth: dry mucous membranes    
Eyes    
PERRL and EOMs intact bilaterally    
Neck    
supple and no JVD    
Resp    
normal respiratory effort    
Resp Narrative:     
No wheezing appreciated.  Slight crackles noted at the right base.  Good air   
movement.    
Cardio    
regular rate and regular rhythm    
Cardio Narrative:     
Positive murmur    
GI    
non-tender and non-distended    
Extremity    
normal to inspection    
Extremity Narrative:     
AV graft with palpable thrill in the left upper extremity present    
General Extremety ED: Negative for edema or tenderness    
General Extremity: Negative for edema    
Neuro    
oriented x3    
Sensorium / Orientation: alert    
Motor Exam: Negative for general weakness    
Psych    
mental status grossly normal    
Skin    
no wounds    
General Skin Exam: Negative for pallor    
Lesions: no lesions    
    
MDM    
MDM    
MDM Narrative    
Medical decision making narrative:     
Patient is evaluated for increased shortness of breath as well as some URI   
symptoms over the past few days.  She was hypoxic on arrival to dialysis.  She   
usually does not wear oxygen except when she is sick or sometimes to work 2 to 3  
L.  She has started wearing oxygen for the past few days because she is been   
feeling more short of breath.    
Upon arrival patient is requiring 2 to 3 L of oxygen.  She is tachypneic with   
any type of exertion.  She is of crackles at the base.  She is complaining of   
nasal congestion, ear fullness as well as chest congestion and sore throat and I  
have more concern for infectious etiology.  She is on complaint of chest pain   
and her EKG does not show any acute ischemic changes.  I do not think there is   
much utility in obtaining troponin at this time as I do not think this is ACS   
and they likely will be falsely elevated due to her end-stage renal disease.    
In addition she did miss hemodialysis today because of her hypoxia however she   
does not appear acutely volume overloaded.    
Patient does have a leukocytosis 11.6 which is above her baseline.  There is a   
neutrophil predominance.  Her BMP is consistent with end-stage renal disease   
with elevated creatinine and BUN however her potassium is normal.  Her   
magnesium, calcium and phosphorus are also elevated.  2 view chest x-ray viewed   
by myself as well as radiology is concerning for bilateral infiltrates worse in   
the left lung base and small bilateral effusions with superimposed CHF.    
Patient is ambulating when she does not desaturate on supplemental oxygen she   
becomes quite symptomatic and is tachypneic.  Given her underlying comorbidities  
as well as concern for bilateral pneumonia in the setting of acute respiratory   
illness I do think she benefit from IV antibiotics and admission.  Case is also   
discussed with Dr. Monet, her nephrologist, as she also likely will need   
dialysis.  He is agreeable with her being admitted to the medicine service and   
of arranging dialysis in the hospital.  Patient started on broad-spectrum   
antibiotics with Rocephin and azithromycin.  Case is discussed admitting   
physician, Dr. Johnson.    
Patient did receive a DuoNeb and route by EMS.  Has no further wheezing she does  
not require further aerosols at this time.    
    
History & Record Review    
Additional record(s) reviewed:: Prior inpatient record (Admission 1 year ago for  
hypoxia secondary to human metapneumovirus as well as pneumonia.  Was discharged  
home on 3 L for the time being.)    
Lab Data    
Attestation: I reviewed the patient's lab results.    
Labs:     
    
                         Laboratory Results - last 24 hr    
    
    
    
  24    
    
  08:23    
     
WBC  11.6 H    
     
RBC  3.17 L    
     
Hgb  9.5 L    
     
Hct  30.3 L    
     
MCV  95.6    
     
MCH  30.0    
     
MCHC  31.4 L    
     
RDW Std Deviation  58.4 H    
     
RDW Coeff of Mihir  16.5 H    
     
Plt Count  345    
     
MPV  9.0    
     
Immature Gran % (Auto)  0.600    
     
Neut % (Auto)  83.6 H    
     
Lymph % (Auto)  7.2 L    
     
Mono % (Auto)  6.6    
     
Eos % (Auto)  1.7    
     
Baso % (Auto)  0.3    
     
Absolute Neuts (auto)  9.7 H    
     
Absolute Lymphs (auto)  0.83    
     
Nucleated RBC %  0    
     
Sodium  136    
     
Potassium  4.5    
     
Chloride  94 L    
     
Carbon Dioxide  29.0    
     
Anion Gap  13    
     
BUN  61 H    
     
Creatinine  8.41 H*    
     
Estim Creat Clear Calc  7.81    
     
Est GFR (MDRD) Af Amer  6 L    
     
Est GFR (MDRD) Non-Af  5 L    
     
BUN/Creatinine Ratio  7.3 L    
     
Glucose  113 H    
     
Calcium  10.4 H    
     
Phosphorus  8.5 H    
     
Magnesium  2.8 H    
    
    
    
    
Radiography    
Chest X-Ray - ED: 2 View, Read by ED Physician, Read by Radiologist, CHF, Right   
Infiltrate and Left Infiltrate    
Diagnostic Testing:     
    
Clinical Impression(s) from Imaging Studies    
    
Chest X-Ray  24 07:59    
IMPRESSION:    
Bilateral infiltrations worse at the left lung base with small bilateral    
pleural effusions superimposed on CHF.    
     
Electronically Signed:    
Harrison Klein MD    
 at 10:27 EDT    
Reading Location ID and State: 3 / [place]    
Tel [phone], Service support  [phone], Fax [phone]    
     
    
    
    
    
Rhythm Strip    
Rhythm Strip: Sinus Rhythm    
Rate: 90    
Ectopy: None    
EKG    
Initial EKG:     
      Attestation: I personally reviewed and interpreted this EKG as follows:    
      Interpretation: Sinus Rhythm    
      Comments: Normal sinus rhythm rate of 90 bpm     
Normal axis     
Normal intervals     
Normal ST segments with nonspecific T wave version lead III, V1 and V2     
Compared to prior EKG no acute changes    
Management    
Discussion w/another healthcare provider: Hospitalist and Consultant    
    
Discharge Plan    
Triage    
Chief Complaint: Shortness of Breath    
    
ED Provider: Mai Vázquez    
    
Dx/Rx/DC Orders    
Clinical Impression:    
 Bilateral pneumonia, ESRD (end stage renal disease) on dialysis, Hypoxia    
    
    
Prescriptions:    
No Action    
  cinacalcet [Sensipar] 60 mg tablet     
   60 mg PO DAILY     
  cholecalciferol (vitamin D3) 25 mcg (1,000 unit) capsule     
   100 mcg PO DAILY     
  levothyroxine 50 mcg tablet     
   50 mcg PO DAILY     
  citalopram 20 MG tablet     
   20 mg PO DAILY     
  albuterol sulfate 90 mcg/actuation HFA aerosol inhaler     
   2 puff inhalation Q6H PRN (Reason: shortness of breath or wheezing) Qty: 8.5   
0RF    
  lanthanum 1,000 mg tablet,chewable     
   1,000 mg PO TID     
  Renal Caps 1 mg capsule     
   1 cap PO DAILY     
  zinc acetate 50 mg (zinc) capsule     
   50 mg PO DAILY     
  amlodipine 10 mg tablet     
   10 mg PO DAILY     
    
Primary Care Provider: Billy Ramos    
    
Referrals:    
Billy Ramos DO [Primary Care Provider] -     
    
Disposition    
***Disposition***: Acute Care Hospital [facility]

## 2024-03-11 NOTE — EKG12_ITS
Test Reason : SOB 
Blood Pressure : ***/*** mmHG 
Vent. Rate : 090 BPM     Atrial Rate : 090 BPM 
   P-R Int : 158 ms          QRS Dur : 084 ms 
    QT Int : 382 ms       P-R-T Axes : 054 047 027 degrees 
   QTc Int : 467 ms 
  
Normal sinus rhythm 
Possible Left atrial enlargement 
Nonspecific ST abnormality 
Abnormal ECG 
Confirmed by Don Cain ([id number]),  SANTOS RAMIREZ ([id number]) on 3/12/2024 11:18:18 AM 
  
Referred By:  CRISSY           Confirmed By:Don Cain

## 2024-03-11 NOTE — RAD_ITS
REPORT-ID:[id number] 
 
STUDY:   X-RAY CHEST 
 
REASON FOR EXAM:   Female, 53 years old.  Sob 
 
TECHNIQUE:   PA and lateral views of the chest. 
 
COMPARISON:   Comparison is made with prior study dated February 23, 2023. 
___________________________________ 
 
FINDINGS: 
EKG electrodes are seen. 
 
Left lower lobe pneumonia with small left pleural effusion. Infiltrate in 
the right middle lobe.  Mild degree of vascular congestion and CHF. 
 
Normal size heart.   Normal mediastinum and linda.  Normal visualized 
pulmonary arteries.  Normal visualized aortic arch and descending thoracic 
aorta. 
 
There are degenerative changes of the visualized thoracic spine.  Normal 
visualized ribs, clavicles, and shoulders. 
 
There is no demonstrated abnormality of the visualized soft tissue 
structures of the upper abdomen. 
___________________________________ 
 
ORDER #: [id number] RAD/Chest PA and Lateral  
IMPRESSION:  
Bilateral infiltrations worse at the left lung base with small bilateral  
pleural effusions superimposed on CHF.  
 
  
Electronically Signed:  
Harrison Klein MD  
2024/03/11 at 10:27 EDT  
Reading Location ID and State: [address] / [place]  
Tel [phone], Service support  [phone], Fax [phone]

## 2024-03-11 NOTE — ED.VIS.DYS
HPI
History of Present Illness
Chief Complaint: Shortness of Breath
Informant: patient and EMS
Narrative
Narrative: 
Patient is a 53-year-old female with history of end-stage renal disease on hemodialysis (does not wear oxygen), MARY and intermittent hypoxia (states she wears oxygen at home sometimes whenever she is sick or has a cold) presenting from hemodialysis 
for concern of hypoxia.  Patient apparently arrived today and was 62% on room air.  Dialysis was concerned and called 911.  Patient was placed on 3 L and given a DuoNeb by EMS and came up to 96%.  She notes that she started wearing oxygen at home 
yesterday because she just could not breathe right.  States that she has been feeling more short of breath and has some chest congestion for the past few days.  Yesterday developed some mild nasal congestion and sore throat.  Has her normal ankles 
well with no acute change.  Denies any fever.  Has a cough but it has been nonproductive however she feels that there is mucus in there.  She is been taking Mucinex with no relief of this.  Denies any cyst abdominal pain, nausea, vomiting or 
diarrhea.  Does not make urine at baseline.  Denies any sick contacts.  Last had hemodialysis on Friday (regularly goes ).  Notes that she did not have any oxygen for travel to dialysis today and had to get gas before coming 
to dialysis as well.  Denies any associated chest pain.

[facility]
Medical History (Reviewed 24 @ 08:29 by Dr. Mai Vázquez, )

Amyloidosis
Amyloidosis
Anemia
Anemia, chronic disease
Anxiety and depression
Chronic anemia
CPAP (continuous positive airway pressure) dependence
Depression
Dialysis patient
End stage renal disease on dialysis
ESRD (end stage renal disease)
ESRD (end stage renal disease) on dialysis
Former smoker
Former tobacco use
GERD (gastroesophageal reflux disease)
Hypothyroidism
Kidney disease
Migraines
Morbid obesity
Morbid obesity due to excess calories
MARY (obstructive sleep apnea)
MARY on CPAP


Home Medications

citalopram 20 mg tablet 20 mg PO DAILY Depression 18 [History Last Taken 03/10/24]
cinacalcet 60 mg tablet (Sensipar) 60 mg PO DAILY 21 [History Last Taken 24]
cholecalciferol (vitamin D3) 25 mcg (1,000 unit) capsule 100 mcg PO DAILY 22 [History Last Taken 03/10/24]
albuterol sulfate 90 mcg/actuation aerosol inhaler 2 puff inhalation Q6H PRN shortness of breath or wheezing #8.5 grams 23 [Rx Last Taken 24]
levothyroxine 50 mcg tablet 50 mcg PO DAILY 23 [History Last Taken 24]
amlodipine 10 mg tablet 10 mg PO DAILY 24 [History Last Taken 03/10/24]
lanthanum 1,000 mg chewable tablet 1,000 mg PO TID 24 [History Last Taken 03/10/24]
vitamin B complex and vitamin C no.20-folic acid 1 mg capsule (Renal Caps) 1 cap PO DAILY 24 [History Last Taken 03/10/24]
zinc acetate 50 mg (zinc) capsule 50 mg PO DAILY 24 [History Last Taken 24]




Allergy/AdvReac Type Severity Reaction Status Date / Time
No Known Allergies Allergy   Verified 24 07:33


Family History (Reviewed 24 @ 08:29 by Dr. Mai Vázquez DO)
Mother
 Dementia
 Leukemia
Father
 Heart disease
 Kidney disease


Surgical History (Reviewed 24 @ 08:29 by Dr. Mai Vázquez DO)

History of cholecystectomy
History of Susan-en-Y gastric bypass
Hx of  section
Stem cells transplant status


Social History (Reviewed 24 @ 08:29 by Dr. Mai Vázquez DO)
household members:  family 
Smoking Status:  Former smoker 
alcohol intake:  never 
substance use type:  does not use 



ROS
ROS ED
Constitutional
Constitutional ED: Denies chills or fever(s)
ENT
ENT ED: Reports sore throat and other Details: nasal congetsion ; 
Denies ear pain
Cardiovascular
Cardiovascular: Denies chest pain
Respiratory/Chest
Respiratory/Chest: Reports cough, dyspnea and dyspnea on exertion; Denies sputum
Gastrointestinal
Gastrointestinal: Denies abdominal pain, diarrhea, nausea or vomiting
Genitourinary
Genitourinary ED: Reports other Details: Anuric at baseline
Musculoskeletal
Musculoskeletal: Denies arthralgias or myalgias
Integumentary
Denies rash
Neurologic
Neurologic: Denies headache(s)
Hematologic/Lymphatic
Hematologic/Lymphatic: Denies easy bleeding or easy bruising

EXAM
Physical Exam
Const
Vital Signs: 



 24
07:33 24
08:01 24
07:32
Temperature 97.6 F L  
Temperature Source Temporal  
Pulse Rate 92  
Respiratory Rate 18  
Respiratory Effort   Normal
Respiratory Depth   Normal
Respiratory Pattern   Normal
Blood Pressure 140/79 H  
Blood Pressure Mean 99  
Pulse Ox 89  
Oxygen Delivery Method Room Air Room Air Nasal Cannula
Oxygen Flow Rate (L/min)   2

 24
07:32 24
09:32 24
11:30
Temperature   
Temperature Source   
Pulse Rate  99 89
Respiratory Rate  16 25 H
Respiratory Effort   
Respiratory Depth   
Respiratory Pattern   
Blood Pressure  148/86 H 141/93 H
Blood Pressure Mean  106 109
Pulse Ox 97 96 94
Oxygen Delivery Method Nasal Cannula Nasal Cannula Nasal Cannula
Oxygen Flow Rate (L/min) 2 2 



Positive well nourished and well developed
General Appearance ED: well developed and NAD; Negative for pallor
HEENT
Reports TM's clear and dry mucous membranes
Tympanic Membrane ED: Yes TM's clear
Mouth ED: Yes dry mucous membranes
Mouth: dry mucous membranes
Eyes
PERRL and EOMs intact bilaterally
Neck
supple and no JVD
Resp
normal respiratory effort
Resp Narrative: 
No wheezing appreciated.  Slight crackles noted at the right base.  Good air movement.
Cardio
regular rate and regular rhythm
Cardio Narrative: 
Positive murmur
GI
non-tender and non-distended
Extremity
normal to inspection
Extremity Narrative: 
AV graft with palpable thrill in the left upper extremity present
General Extremety ED: Negative for edema or tenderness
General Extremity: Negative for edema
Neuro
oriented x3
Sensorium / Orientation: alert
Motor Exam: Negative for general weakness
Psych
mental status grossly normal
Skin
no wounds
General Skin Exam: Negative for pallor
Lesions: no lesions

MDM
MDM
MDM Narrative
Medical decision making narrative: 
Patient is evaluated for increased shortness of breath as well as some URI symptoms over the past few days.  She was hypoxic on arrival to dialysis.  She usually does not wear oxygen except when she is sick or sometimes to work 2 to 3 L.  She has 
started wearing oxygen for the past few days because she is been feeling more short of breath.
Upon arrival patient is requiring 2 to 3 L of oxygen.  She is tachypneic with any type of exertion.  She is of crackles at the base.  She is complaining of nasal congestion, ear fullness as well as chest congestion and sore throat and I have more 
concern for infectious etiology.  She is on complaint of chest pain and her EKG does not show any acute ischemic changes.  I do not think there is much utility in obtaining troponin at this time as I do not think this is ACS and they likely will be 
falsely elevated due to her end-stage renal disease.
In addition she did miss hemodialysis today because of her hypoxia however she does not appear acutely volume overloaded.
Patient does have a leukocytosis 11.6 which is above her baseline.  There is a neutrophil predominance.  Her BMP is consistent with end-stage renal disease with elevated creatinine and BUN however her potassium is normal.  Her magnesium, calcium and 
phosphorus are also elevated.  2 view chest x-ray viewed by myself as well as radiology is concerning for bilateral infiltrates worse in the left lung base and small bilateral effusions with superimposed CHF.
Patient is ambulating when she does not desaturate on supplemental oxygen she becomes quite symptomatic and is tachypneic.  Given her underlying comorbidities as well as concern for bilateral pneumonia in the setting of acute respiratory illness I 
do think she benefit from IV antibiotics and admission.  Case is also discussed with Dr. Monet, her nephrologist, as she also likely will need dialysis.  He is agreeable with her being admitted to the medicine service and of arranging dialysis in 
the hospital.  Patient started on broad-spectrum antibiotics with Rocephin and azithromycin.  Case is discussed admitting physician, Dr. Johnson.
Patient did receive a DuoNeb and route by EMS.  Has no further wheezing she does not require further aerosols at this time.

History & Record Review
Additional record(s) reviewed:: Prior inpatient record (Admission 1 year ago for hypoxia secondary to human metapneumovirus as well as pneumonia.  Was discharged home on 3 L for the time being.)
Lab Data
Attestation: I reviewed the patient's lab results.
Labs: 

Laboratory Results - last 24 hr

  24
  08:23
WBC  11.6 H
RBC  3.17 L
Hgb  9.5 L
Hct  30.3 L
MCV  95.6
MCH  30.0
MCHC  31.4 L
RDW Std Deviation  58.4 H
RDW Coeff of Mihir  16.5 H
Plt Count  345
MPV  9.0
Immature Gran % (Auto)  0.600
Neut % (Auto)  83.6 H
Lymph % (Auto)  7.2 L
Mono % (Auto)  6.6
Eos % (Auto)  1.7
Baso % (Auto)  0.3
Absolute Neuts (auto)  9.7 H
Absolute Lymphs (auto)  0.83
Nucleated RBC %  0
Sodium  136
Potassium  4.5
Chloride  94 L
Carbon Dioxide  29.0
Anion Gap  13
BUN  61 H
Creatinine  8.41 H*
Estim Creat Clear Calc  7.81
Est GFR (MDRD) Af Amer  6 L
Est GFR (MDRD) Non-Af  5 L
BUN/Creatinine Ratio  7.3 L
Glucose  113 H
Calcium  10.4 H
Phosphorus  8.5 H
Magnesium  2.8 H



Radiography
Chest X-Ray - ED: 2 View, Read by ED Physician, Read by Radiologist, CHF, Right Infiltrate and Left Infiltrate
Diagnostic Testing: 

Clinical Impression(s) from Imaging Studies

Chest X-Ray  24 07:59
IMPRESSION:
Bilateral infiltrations worse at the left lung base with small bilateral
pleural effusions superimposed on CHF.
 
Electronically Signed:
Harrison Klein MD
 at 10:27 EDT
Reading Location ID and State: 3 / [place]
Tel [phone], Service support  [phone], Fax [phone]
 




Rhythm Strip
Rhythm Strip: Sinus Rhythm
Rate: 90
Ectopy: None
EKG
Initial EKG: 
      Attestation: I personally reviewed and interpreted this EKG as follows:
      Interpretation: Sinus Rhythm
      Comments: Normal sinus rhythm rate of 90 bpm 
Normal axis 
Normal intervals 
Normal ST segments with nonspecific T wave version lead III, V1 and V2 
Compared to prior EKG no acute changes
Management
Discussion w/another healthcare provider: Hospitalist and Consultant

Discharge Plan
Triage
Chief Complaint: Shortness of Breath

ED Provider: Mai Vázquez

Dx/Rx/DC Orders
Clinical Impression:
 Bilateral pneumonia, ESRD (end stage renal disease) on dialysis, Hypoxia


Prescriptions:
No Action
  cinacalcet [Sensipar] 60 mg tablet 
   60 mg PO DAILY 
  cholecalciferol (vitamin D3) 25 mcg (1,000 unit) capsule 
   100 mcg PO DAILY 
  levothyroxine 50 mcg tablet 
   50 mcg PO DAILY 
  citalopram 20 MG tablet 
   20 mg PO DAILY 
  albuterol sulfate 90 mcg/actuation HFA aerosol inhaler 
   2 puff inhalation Q6H PRN (Reason: shortness of breath or wheezing) Qty: 8.5 0RF
  lanthanum 1,000 mg tablet,chewable 
   1,000 mg PO TID 
  Renal Caps 1 mg capsule 
   1 cap PO DAILY 
  zinc acetate 50 mg (zinc) capsule 
   50 mg PO DAILY 
  amlodipine 10 mg tablet 
   10 mg PO DAILY 

Primary Care Provider: Billy Ramos

Referrals:
Billy Ramos DO [Primary Care Provider] - 

Disposition
***Disposition***: [facility]

## 2024-03-12 VITALS
DIASTOLIC BLOOD PRESSURE: 89 MMHG | RESPIRATION RATE: 18 BRPM | OXYGEN SATURATION: 94 % | HEART RATE: 91 BPM | SYSTOLIC BLOOD PRESSURE: 154 MMHG | TEMPERATURE: 97.4 F

## 2024-03-12 VITALS
RESPIRATION RATE: 18 BRPM | DIASTOLIC BLOOD PRESSURE: 60 MMHG | TEMPERATURE: 98.8 F | OXYGEN SATURATION: 97 % | HEART RATE: 90 BPM | SYSTOLIC BLOOD PRESSURE: 121 MMHG

## 2024-03-12 VITALS
HEART RATE: 78 BPM | DIASTOLIC BLOOD PRESSURE: 67 MMHG | OXYGEN SATURATION: 95 % | TEMPERATURE: 98.6 F | RESPIRATION RATE: 16 BRPM | SYSTOLIC BLOOD PRESSURE: 115 MMHG

## 2024-03-12 VITALS — RESPIRATION RATE: 20 BRPM | HEART RATE: 85 BPM

## 2024-03-12 VITALS — HEART RATE: 88 BPM | RESPIRATION RATE: 20 BRPM | OXYGEN SATURATION: 96 %

## 2024-03-12 VITALS — OXYGEN SATURATION: 85 %

## 2024-03-12 LAB
ANION GAP: 12 (ref 5–15)
BUN SERPL-MCNC: 50 MG/DL (ref 7–18)
BUN/CREAT RATIO: 7.4 RATIO (ref 10–20)
CALCIUM SERPL-MCNC: 11.3 MG/DL (ref 8.5–10.1)
CARBON DIOXIDE: 27 MMOL/L (ref 21–32)
CHLORIDE: 96 MMOL/L (ref 98–107)
DEPRECATED RDW RBC: 58 FL (ref 35.1–43.9)
DIFFERENTIAL INDICATED: (no result)
ERYTHROCYTE [DISTWIDTH] IN BLOOD: 16.6 % (ref 11.6–14.6)
EST GLOM FILT RATE - AFR AMER: 8 ML/MIN (ref 60–?)
ESTIMATED CREATININE CLEARANCE: 9.16 ML/MIN
GLUCOSE: 102 MG/DL (ref 74–106)
HCT VFR BLD AUTO: 33.7 % (ref 37–47)
HGB BLD-MCNC: 10.3 G/DL (ref 12–15)
IMMATURE GRANULOCYTES COUNT: 0.04 X10^3/UL (ref 0–0)
MCV RBC: 95.5 FL (ref 81–99)
MEAN CORP HGB CONC: 30.6 G/DL (ref 32–36)
MEAN PLATELET VOL.: 10.7 FL (ref 6.2–12)
NRBC FLAGGED BY ANALYZER: 0 % (ref 0–5)
PLATELET # BLD: (no result) K/MM3 (ref 150–450)
POSITIVE COUNT: YES
POTASSIUM: 4.4 MMOL/L (ref 3.5–5.1)
RBC # BLD AUTO: 3.53 M/MM3 (ref 4.2–5.4)
SCAN SMEAR PER REVIEW CRITERIA: (no result)
WBC # BLD AUTO: 9.6 K/MM3 (ref 4.4–11)

## 2024-03-12 RX ADMIN — NEOMYCIN SULFATE, POLYMYXIN B SULFATE AND DEXAMETHASONE 4 DRP: 3.5; 10000; 1 SUSPENSION/ DROPS OPHTHALMIC at 09:36

## 2024-03-12 RX ADMIN — RENO CAPS 1 CAP: 100; 1.5; 1.7; 20; 10; 1; 150; 5; 6 CAPSULE ORAL at 08:18

## 2024-03-12 NOTE — CON.PCM.RE_ITS
Assessment & Plan    
Assessment/Plan    
(1) ESRD (end stage renal disease) on dialysis:     
PLAN: On hemodialysis.  S/p dialysis significantly better.  Called and spoke   
with the dialysis unit, and dry weight will be adjusted down.  Discussed with   
hospitalist.  Possible discharge home today.    
    
HPI    
Consult Data    
Date of Consult: 24    
HPI Narrative    
Reason for Consultation: ESRD    
HPI Narrative:     
TIERA MCCULLOUGH, is a 53 F who presents to the hospital with shortness of breath  
.  Received dialysis yesterday.  History of ESRD, on hemodialysis Monday,   
Wednesday, Friday.  Recently had gastric bypass, she has lost a significant   
amount of weight since then.  She went to urgent care about a week ago with   
cough and shortness of breath, was given antibiotics.  Presented to the ER   
yesterday with worsening shortness of breath.  Admission diagnosis pneumonia   
versus fluid overload.  Feels significantly better.  Likely fluid overload.  No   
cough.  No fever, chills.    
    
[facility]    
Medical History (Reviewed 24 @ 13:18 by Dr. Roc Johnson MD)    
    
Amyloidosis    
Amyloidosis    
Anemia    
Anemia, chronic disease    
Anxiety and depression    
Chronic anemia    
CPAP (continuous positive airway pressure) dependence    
Depression    
Dialysis patient    
End stage renal disease on dialysis    
ESRD (end stage renal disease)    
ESRD (end stage renal disease) on dialysis    
Former smoker    
Former tobacco use    
GERD (gastroesophageal reflux disease)    
Hypothyroidism    
Kidney disease    
Migraines    
Morbid obesity    
Morbid obesity due to excess calories    
MARY (obstructive sleep apnea)    
MARY on CPAP    
    
    
                                Home Medications    
    
citalopram 20 mg tablet 20 mg PO DAILY Depression 18 [History Last Taken   
03/10/24]    
cinacalcet 60 mg tablet (Sensipar) 60 mg PO DAILY 21 [History Last Taken   
24]    
cholecalciferol (vitamin D3) 25 mcg (1,000 unit) capsule 100 mcg PO DAILY   
22 [History Last Taken 03/10/24]    
albuterol sulfate 90 mcg/actuation aerosol inhaler 2 puff inhalation Q6H PRN   
shortness of breath or wheezing #8.5 grams 23 [Rx Last Taken 24]    
levothyroxine 50 mcg tablet 50 mcg PO DAILY 23 [History Last Taken   
24]    
amlodipine 10 mg tablet 10 mg PO DAILY 24 [History Last Taken 03/10/24]    
lanthanum 1,000 mg chewable tablet 1,000 mg PO TID 24 [History Last Taken   
03/10/24]    
vitamin B complex and vitamin C no.20-folic acid 1 mg capsule (Renal Caps) 1 cap  
PO DAILY 24 [History Last Taken 03/10/24]    
zinc acetate 50 mg (zinc) capsule 50 mg PO DAILY 24 [History Last Taken   
24]    
levofloxacin 500 mg tablet 500 mg PO Q48H 6 days #3 tabs 24 [Rx Last Taken  
 Unknown]    
neomycin-polymyxin-hydrocort 3.5 mg-10,000 unit/mL-1 % ear drops,susp 4 drp otic  
(ear) Q6H 1 week #10 mL 24 [Rx Last Taken Unknown]    
    
    
                                            
    
    
    
Allergy/AdvReac Type Severity Reaction Status Date / Time    
     
No Known Allergies Allergy   Verified 24 07:33    
    
    
    
Family History (Reviewed 24 @ 13:18 by Dr. Roc Johnson MD)    
Mother   Dementia    
   Leukemia    
Father   Heart disease    
   Kidney disease    
    
    
Surgical History (Reviewed 24 @ 13:18 by Dr. Roc Johnson MD)    
    
History of cholecystectomy    
History of Susan-en-Y gastric bypass    
Hx of  section    
Stem cells transplant status    
    
    
Social History (Reviewed 24 @ 13:18 by Dr. Roc Johnson MD)    
household members:  family     
Smoking Status:  Former smoker     
alcohol intake:  never     
substance use type:  does not use     
    
    
    
ROS    
ROS Narrative    
Negative except above    
    
Physical Exam    
Narrative    
Alert awake oriented x 3    
no obvious distress    
no pallor no icterus    
no JVD    
s1s2 no murmurs    
lungs clear    
abdomen soft no organomegaly    
no edema    
no cyanosis    
    
    
Lab / Micro Data    
    
                                                        24 07:24              
    
                                                        24 07:24              
    
Labs:     
                         Laboratory Results - last 24 hr    
    
24 08:23: Phosphorus 8.5 H, Magnesium 2.8 H    
24 07:24: WBC 9.6, RBC 3.53 L, Hgb 10.3 L, Hct 33.7 L, MCV 95.5, MCH 29.2,  
MCHC 30.6 L, RDW Std Deviation 58.0 H, RDW Coeff of Mihir 16.6 H, Plt Count TNP,   
MPV 10.7, Immature Gran % (Auto) 0.400, Neut % (Auto) 79.8 H, Lymph % (Auto)   
10.2 L, Mono % (Auto) 6.5, Eos % (Auto) 2.5, Baso % (Auto) 0.6, Absolute Neuts   
(auto) 7.6, Absolute Lymphs (auto) 0.98, Nucleated RBC % 0, Platelet Estimate   
ADEQUATE, Sodium 135 L, Potassium 4.4, Chloride 96 L, Carbon Dioxide 27.0, Anion  
Gap 12, BUN 50 H, Creatinine 6.79 H, Estim Creat Clear Calc 9.16, Est GFR (MDRD)  
Af Amer 8 L, Est GFR (MDRD) Non-Af 7 L, BUN/Creatinine Ratio 7.4 L, Glucose 102,  
Calcium 11.3 H    
    
    
Micro:     
                                  Microbiology    
    
24 11:40   Sputum, Expectorated/Coughed   Gram Stain - Final    
24 14:50   Mucosa - Nose   Respiratory Panel (PCR) - Final    
24 08:23   Mucosa - Nasopharyngeal   SARS-CoV-2, Influenza & RSV (PCR) -   
Final    
    
    
Rhythm Strip    
Rhythm Strip: Sinus Rhythm    
Rate: 90    
Ectopy: None

## 2024-03-12 NOTE — PHA.DC.MC.R
Pharmacy [facility]
Pharmacy Service has performed discharge medication reconciliation and counseling for this patient.

1. LEVOFLOXACIN 500MG PO Q48H X 3 DOSES
2. NEOMYCIN/POLYMYXIN/HC 4GTT AU Q6H X 7 DAYS

The patient's discharge medication list was reviewed for discrepancies and discrepancies were resolved. 

The patient was counseled on the following discharge medications and changes in medications for homegoing were reviewed.
The Reason for Use, instructions for use, and potential side effects were reviewed for all new medications. 
The patient's questions regarding all of their medications were answered.

The patient was able to verbally demonstrate an understanding of their discharge medications.




Medications at Discharge
Home Medications

citalopram 20 mg tablet 20 mg PO DAILY Depression 02/01/18 
cinacalcet 60 mg tablet (Sensipar) 60 mg PO DAILY 09/02/21 
cholecalciferol (vitamin D3) 25 mcg (1,000 unit) capsule 100 mcg PO DAILY 03/01/22 
albuterol sulfate 90 mcg/actuation aerosol inhaler 2 puff inhalation Q6H PRN shortness of breath or wheezing #8.5 grams 02/25/23 
levothyroxine 50 mcg tablet 50 mcg PO DAILY 04/18/23 
amlodipine 10 mg tablet 10 mg PO DAILY 03/11/24 
lanthanum 1,000 mg chewable tablet 1,000 mg PO TID 03/11/24 
vitamin B complex and vitamin C no.20-folic acid 1 mg capsule (Renal Caps) 1 cap PO DAILY 03/11/24 
zinc acetate 50 mg (zinc) capsule 50 mg PO DAILY 03/11/24 
levofloxacin 500 mg tablet 500 mg PO Q48H 6 days #3 tabs 03/12/24 
neomycin-polymyxin-hydrocort 3.5 mg-10,000 unit/mL-1 % ear drops,susp 4 drp otic (ear) Q6H 1 week #10 mL 03/12/24

## 2024-03-12 NOTE — PHA.DC_ITS
Pharmacy [facility]    
Pharmacy Service has performed discharge medication reconciliation and   
counseling for this patient.    
    
1. LEVOFLOXACIN 500MG PO Q48H X 3 DOSES    
2. NEOMYCIN/POLYMYXIN/HC 4GTT AU Q6H X 7 DAYS    
    
The patient's discharge medication list was reviewed for discrepancies and   
discrepancies were resolved.     
    
The patient was counseled on the following discharge medications and changes in   
medications for homegoing were reviewed.    
The Reason for Use, instructions for use, and potential side effects were   
reviewed for all new medications.     
The patient's questions regarding all of their medications were answered.    
    
The patient was able to verbally demonstrate an understanding of their discharge  
medications.    
    
    
    
    
Medications at Discharge    
Home Medications    
    
citalopram 20 mg tablet 20 mg PO DAILY Depression 02/01/18     
cinacalcet 60 mg tablet (Sensipar) 60 mg PO DAILY 09/02/21     
cholecalciferol (vitamin D3) 25 mcg (1,000 unit) capsule 100 mcg PO DAILY   
03/01/22     
albuterol sulfate 90 mcg/actuation aerosol inhaler 2 puff inhalation Q6H PRN   
shortness of breath or wheezing #8.5 grams 02/25/23     
levothyroxine 50 mcg tablet 50 mcg PO DAILY 04/18/23     
amlodipine 10 mg tablet 10 mg PO DAILY 03/11/24     
lanthanum 1,000 mg chewable tablet 1,000 mg PO TID 03/11/24     
vitamin B complex and vitamin C no.20-folic acid 1 mg capsule (Renal Caps) 1 cap  
PO DAILY 03/11/24     
zinc acetate 50 mg (zinc) capsule 50 mg PO DAILY 03/11/24     
levofloxacin 500 mg tablet 500 mg PO Q48H 6 days #3 tabs 03/12/24     
neomycin-polymyxin-hydrocort 3.5 mg-10,000 unit/mL-1 % ear drops,susp 4 drp otic  
(ear) Q6H 1 week #10 mL 03/12/24

## 2024-03-12 NOTE — PCM.CONS.R
Assessment & Plan
Assessment/Plan
(1) ESRD (end stage renal disease) on dialysis: 
PLAN: On hemodialysis.  S/p dialysis significantly better.  Called and spoke with the dialysis unit, and dry weight will be adjusted down.  Discussed with hospitalist.  Possible discharge home today.

HPI
Consult Data
Date of Consult: 24
HPI Narrative
Reason for Consultation: ESRD
HPI Narrative: 
TIERA MCCULLOUGH, is a 53 F who presents to the hospital with shortness of breath.  Received dialysis yesterday.  History of ESRD, on hemodialysis Monday, Wednesday, Friday.  Recently had gastric bypass, she has lost a significant amount of weight 
since then.  She went to urgent care about a week ago with cough and shortness of breath, was given antibiotics.  Presented to the ER yesterday with worsening shortness of breath.  Admission diagnosis pneumonia versus fluid overload.  Feels 
significantly better.  Likely fluid overload.  No cough.  No fever, chills.

[facility] [place]
Medical History (Reviewed 24 @ 13:18 by Dr. Roc Johnson MD)

Amyloidosis
Amyloidosis
Anemia
Anemia, chronic disease
Anxiety and depression
Chronic anemia
CPAP (continuous positive airway pressure) dependence
Depression
Dialysis patient
End stage renal disease on dialysis
ESRD (end stage renal disease)
ESRD (end stage renal disease) on dialysis
Former smoker
Former tobacco use
GERD (gastroesophageal reflux disease)
Hypothyroidism
Kidney disease
Migraines
Morbid obesity
Morbid obesity due to excess calories
MARY (obstructive sleep apnea)
MARY on CPAP


Home Medications

citalopram 20 mg tablet 20 mg PO DAILY Depression 18 [History Last Taken 03/10/24]
cinacalcet 60 mg tablet (Sensipar) 60 mg PO DAILY 21 [History Last Taken 24]
cholecalciferol (vitamin D3) 25 mcg (1,000 unit) capsule 100 mcg PO DAILY 22 [History Last Taken 03/10/24]
albuterol sulfate 90 mcg/actuation aerosol inhaler 2 puff inhalation Q6H PRN shortness of breath or wheezing #8.5 grams 23 [Rx Last Taken 24]
levothyroxine 50 mcg tablet 50 mcg PO DAILY 23 [History Last Taken 24]
amlodipine 10 mg tablet 10 mg PO DAILY 24 [History Last Taken 03/10/24]
lanthanum 1,000 mg chewable tablet 1,000 mg PO TID 24 [History Last Taken 03/10/24]
vitamin B complex and vitamin C no.20-folic acid 1 mg capsule (Renal Caps) 1 cap PO DAILY 24 [History Last Taken 03/10/24]
zinc acetate 50 mg (zinc) capsule 50 mg PO DAILY 24 [History Last Taken 24]
levofloxacin 500 mg tablet 500 mg PO Q48H 6 days #3 tabs 24 [Rx Last Taken Unknown]
neomycin-polymyxin-hydrocort 3.5 mg-10,000 unit/mL-1 % ear drops,susp 4 drp otic (ear) Q6H 1 week #10 mL 24 [Rx Last Taken Unknown]




Allergy/AdvReac Type Severity Reaction Status Date / Time
No Known Allergies Allergy   Verified 24 07:33


Family History (Reviewed 24 @ 13:18 by Dr. Roc Johnson MD)
Mother
 Dementia
 Leukemia
Father
 Heart disease
 Kidney disease


Surgical History (Reviewed 24 @ 13:18 by Dr. Roc Johnson MD)

History of cholecystectomy
History of Susan-en-Y gastric bypass
Hx of  section
Stem cells transplant status


Social History (Reviewed 24 @ 13:18 by Dr. Roc Johnson MD)
household members:  family 
Smoking Status:  Former smoker 
alcohol intake:  never 
substance use type:  does not use 



ROS
ROS Narrative
Negative except above

Physical Exam
Narrative
Alert awake oriented x 3
no obvious distress
no pallor no icterus
no JVD
s1s2 no murmurs
lungs clear
abdomen soft no organomegaly
no edema
no cyanosis


Lab / Micro Data

24 07:24          

24 07:24          

Labs: 
Laboratory Results - last 24 hr

24 08:23: Phosphorus 8.5 H, Magnesium 2.8 H
24 07:24: WBC 9.6, RBC 3.53 L, Hgb 10.3 L, Hct 33.7 L, MCV 95.5, MCH 29.2, MCHC 30.6 L, RDW Std Deviation 58.0 H, RDW Coeff of Mihir 16.6 H, Plt Count TNP, MPV 10.7, Immature Gran % (Auto) 0.400, Neut % (Auto) 79.8 H, Lymph % (Auto) 10.2 L, Mono 
% (Auto) 6.5, Eos % (Auto) 2.5, Baso % (Auto) 0.6, Absolute Neuts (auto) 7.6, Absolute Lymphs (auto) 0.98, Nucleated RBC % 0, Platelet Estimate ADEQUATE, Sodium 135 L, Potassium 4.4, Chloride 96 L, Carbon Dioxide 27.0, Anion Gap 12, BUN 50 H, 
Creatinine 6.79 H, Estim Creat Clear Calc 9.16, Est GFR (MDRD) Af Amer 8 L, Est GFR (MDRD) Non-Af 7 L, BUN/Creatinine Ratio 7.4 L, Glucose 102, Calcium 11.3 H


Micro: 
Microbiology

24 11:40   Sputum, Expectorated/Coughed   Gram Stain - Final
24 14:50   Mucosa - Nose   Respiratory Panel (PCR) - Final
24 08:23   Mucosa - Nasopharyngeal   SARS-CoV-2, Influenza & RSV (PCR) - Final


Rhythm Strip
Rhythm Strip: Sinus Rhythm
Rate: 90
Ectopy: None

## 2024-03-12 NOTE — PCM.DC
Discharge Instructions
Diet
Discharge Diet: No restrictions
Activity
Discharge Activity: Return to Normal Activity
Weight Bearing Status: Weight bearing as tolerated
Dressing / Incision
Call your doctor if you observe: Fever of 101 or Higher, Coldness, Increased Pain, Numbness or Tingling, Change in Color, Inability to urinate, Inability to have a bowel movement, Using more than 1 pad per hour, Shortness of breath, Dizziness, 
Fainting spells, Swelling in the ankles, Chest pain, Prolonged hiccupping, Increased palpitations (irregular heartbeat) and Calf discomfort
Follow Up Care
When: IN 2 WEEKS
Test Results: 
Test results from this visit will be discussed in further detail at your follow-up appointment, if applicable.


Discharge Plan
Admission
Admit Date/Time: 03/11/24 10:26

Primary Reason for Your Visit: Pneumonia

Attending Provider: Roc Johnson

Primary Care Provider: Billy Ramos

Consulting Providers: Rolf Monet

Discharge Orders/Prescriptions
Prescriptions:
New
  neomycin-polymyxin-HC 3.5-10,000-1 mg/mL-unit/mL-% Drops,Suspension 
   4 drp otic (ear) Q6H 7 Days Qty: 10 0RF
   Rx Instructions:
   Both ears
  levofloxacin 500 mg tablet 
   500 mg PO Q48H 6 Days Qty: 3 0RF

Continued
  cinacalcet [Sensipar] 60 mg tablet 
   60 mg PO DAILY 
  cholecalciferol (vitamin D3) 25 mcg (1,000 unit) capsule 
   100 mcg PO DAILY 
  levothyroxine 50 mcg tablet 
   50 mcg PO DAILY 
  citalopram 20 MG tablet 
   20 mg PO DAILY 
  albuterol sulfate 90 mcg/actuation HFA aerosol inhaler 
   2 puff inhalation Q6H PRN (Reason: shortness of breath or wheezing) Qty: 8.5 0RF
  lanthanum 1,000 mg tablet,chewable 
   1,000 mg PO TID 
  Renal Caps 1 mg capsule 
   1 cap PO DAILY 
  zinc acetate 50 mg (zinc) capsule 
   50 mg PO DAILY 
  amlodipine 10 mg tablet 
   10 mg PO DAILY 

Referrals / Follow Up:
Johann Noel MD [Med Staff - Active Staff] - Within 1 Week (Right ear pain, otitis media)
Rolf Monet MD [Med Staff - Consulting] - Within 1 Month
Billy Ramos DO [Primary Care Provider] - 

Disposition
Disposition (needs filled in before D/C Order can be placed): Home, Self Care

## 2024-03-12 NOTE — CASEMGMT
Patient order for discharge.  Patient is maintaining on home oxygen order of 3lpm continuously.  RN CM updated patient regarding home oxygen orders.  Patient states her tanks are all empty at home.  RN CM called [facility] and ok to give tank from 
supply.   RN LENI provided tank and [facility] information to call to exchange tanks.  RN LENI instructed patient to track her SPo2 and to follow up with PCP regarding oxygen use, patient voiced understanding.  Patient denies need or help at 
discharge.  Patient had no further questions or concerns.

## 2024-03-12 NOTE — PCM.DC.SUM
Providers
Date of Admission: 03/11/24
Primary Care Physician: 
Dr. Billy Ramos, 

Consultations

03/11/24 14:52
Consult: Nephrology Routine 
 Consulting Provider: Rolf Monet
 Reason for Consult: ESRD on HD, DUE FOR HD today
 EMERGENT Consult: No
 MD Notified: Yes
 Date Notified: 03/11/24
 Time Notified: 13:29
 Method of Notification: ED Physician Initiated



Reason For Visit: SOB, HYPOXIA

Diagnosis
Discharge Diagnosis
(1) Bilateral pneumonia: 
      Status: Acute
      Code(s):
J18.9 - Pneumonia, unspecified organism
      Qualifiers:
        Lung location: lower lobe of lung  Pneumonia type: due to unspecified organism  Qualified Code(s): J18.9 - Pneumonia, unspecified organism

Plan
This is a 53-year-old female being admitted for acute on chronic shortness of breath and URI symptoms and chest x-ray finding suggestive of bilateral lung bases pneumonia.

1.  Mild bilateral pneumonia: Patient is being admitted in PCU.  Flu COVID and RSV PCR are negative.  Pneumonia workup with a sputum culture and respiratory panel ordered.  DuoNeb as needed.  Patient started on IV ceftriaxone and azithromycin.  Mild 
leukocytosis.  Patient was last admitted in February 2023 for right lower lobe pneumonia.
3/12: From [employer], CM told me that patient is on continuous 3 L of oxygen but patient uses 1 L as needed.  Today, the patient on 2 L of oxygen.  Patient did not have significant cough or respiratory distress or shortness of breath.  
Patient had 2 doses of IV ceftriaxone and Zithromax.  Discharged on Levaquin for 5 more days.

2.  ESRD on hemodialysis : She is due for hemodialysis today but could not make it to the dialysis center because of hypoxia and shortness of breath.  Nephrologist is consulted.
3/12: Nephrologist was consulted.  Patient got dialysis and felt better with regards to shortness of breath.

3.  Mild acute on chronic HFpEF with suspicion of mild pulmonary venous congestion/small pleural effusion: It seems patient has chronic HFpEF but she is unaware.  Last echo in November 2019 shows EF 55%, stage II diastolic dysfunction, no RWMA 1-2+ 
TR.  PASP 42 mmHg.Patient had CATRACHITO on December 2019 and EF 65% reported with no obvious vegetation.   repeat echo today
3/12: 2D echo is reported EF 70% LA moderately enlarged.  Severe mitral valve stenosis, severe pulmonary hypertension, 90 mmHg with moderate 1-2+ aortic valve insufficiency.  Mild mitral valve insufficiency.  Follow-up cardiologist as an outpatient.

4.  Anemia of chronic disease due to ESRD: Hemoglobin is on the baseline H&H 9.5/30%.

5.  Amyloidosis: Patient s/p stem cell transplant in remission, encourage continued outpatient follow-up.

6   Anxiety and depression: continue patient home citalopram regimen.

7.  Hypertension: Blood pressure profile is better after Susan-en-Y gastric bypass surgery.  Patient was on  on nifedipine and propranolol, but all of these medications after gastric bypass currently only on amlodipine 10 mg daily.  Resumed. 

8.  Multiple other comorbidities include dyslipidemia and GERD, obstructive sleep apnea on CPAP 13 cm of water and hypothyroidism.  Patient follows in pulmonary clinic with Dr. Larry.  TSH and free T4 ordered.
DVT prophylaxis: Heparin 5/30/2020 subcutaneous 3 times daily.  Discontinue if platelet count drops less than 50,000 or hemoglobin less than 8 g%


Clinical Impression(s) from Imaging Studies

Chest X-Ray  03/11/24 07:59
IMPRESSION:
Bilateral infiltrations worse at the left lung base with small bilateral
pleural effusions superimposed on CHF.
 
Living will/advanced directive/end of life care: Patient not have living will or advanced directive.   near the bedside in ED is power of  for health.  After discussion of benefits/risks procedures involved with  full code, DNR CC 
arrest and DNR CC, the patient opted for full code.
Patient  does want artificial life support including intubation, tube feed, ventilator and/chest compression, central venous catheter, vasopressor and DC shock if needed
   Total time spent in face-to-face encounter in discussion of advanced directive 17 minutes.

Microbiology Past 72 Hours

03/11/24 08:23   Mucosa - Nasopharyngeal   SARS-CoV-2, Influenza & RSV (PCR) - Final

Laboratory Results

03/11/24 08:23: WBC 11.6 H, RBC 3.17 L, Hgb 9.5 L, Hct 30.3 L, MCV 95.6, MCH 30.0, MCHC 31.4 L, RDW Std Deviation 58.4 H, RDW Coeff of Mihir 16.5 H, Plt Count 345, MPV 9.0, Immature Gran % (Auto) 0.600, Neut % (Auto) 83.6 H, Lymph % (Auto) 7.2 L, Mono 
% (Auto) 6.6, Eos % (Auto) 1.7, Baso % (Auto) 0.3, Absolute Neuts (auto) 9.7 H, Absolute Lymphs (auto) 0.83, Nucleated RBC % 0, Sodium 136, Potassium 4.5, Chloride 94 L, Carbon Dioxide 29.0, Anion Gap 13, BUN 61 H, Creatinine 8.41 H*, Estim Creat 
Clear Calc 7.81, Est GFR (MDRD) Af Amer 6 L, Est GFR (MDRD) Non-Af 5 L, BUN/Creatinine Ratio 7.3 L, Glucose 113 H, Calcium 10.4 H, Phosphorus 8.5 H, Magnesium 2.8 H


Clinical Impression(s) from Imaging Studies

Chest X-Ray  03/11/24 07:59
IMPRESSION:
Bilateral infiltrations worse at the left lung base with small bilateral
pleural effusions superimposed on CHF.
 
Electronically Signed:
Harrison Klein MD
2024/03/11 at 10:27 EDT
Reading Location ID and State: [address] / [place]
Tel [phone], Service support  [phone], Fax [phone]
 




Medications at Discharge
Home Medications

citalopram 20 mg tablet 20 mg PO DAILY Depression 02/01/18 
cinacalcet 60 mg tablet (Sensipar) 60 mg PO DAILY 09/02/21 
cholecalciferol (vitamin D3) 25 mcg (1,000 unit) capsule 100 mcg PO DAILY 03/01/22 
albuterol sulfate 90 mcg/actuation aerosol inhaler 2 puff inhalation Q6H PRN shortness of breath or wheezing #8.5 grams 02/25/23 
levothyroxine 50 mcg tablet 50 mcg PO DAILY 04/18/23 
amlodipine 10 mg tablet 10 mg PO DAILY 03/11/24 
lanthanum 1,000 mg chewable tablet 1,000 mg PO TID 03/11/24 
vitamin B complex and vitamin C no.20-folic acid 1 mg capsule (Renal Caps) 1 cap PO DAILY 03/11/24 
zinc acetate 50 mg (zinc) capsule 50 mg PO DAILY 03/11/24 
levofloxacin 500 mg tablet 500 mg PO Q48H 6 days #3 tabs 03/12/24 
neomycin-polymyxin-hydrocort 3.5 mg-10,000 unit/mL-1 % ear drops,susp 4 drp otic (ear) Q6H 1 week #10 mL 03/12/24 



Physical Exam
Narrative
Patient complained of right ear pain yesterday but got worse last night about 8-10/10 intensity.  It is better today.  Right ear examined.  Patient on 2 L of oxygen.  Shortness of breath much improved after dialysis


Seen and examined.
General: Alert, Oriented x3, Cooperative
HEENT: Right ear: Mild congestion, light reflex not clear, foggy.  Bony structures seen.  Left ear light reflex slight foggy but better.  TM intact.  Atraumatic, PERRLA, EOMI, Normocephalic
Oral: Oral mucosa dry no Gingival or Mucosal Lesions/ Ulcerations
Neck: Supple, No JVD, Negative Carotid Bruits
Chest wall/Lungs:  Air entry diminished in bilateral lung bases.  Lungs clear.  No crepitation/rhonchi.
Cardiovascular: Regular rate, Regular Rhythm, Normal S1, Normal S2, systolic murmur over LLSB and right second ICS.
Abdomen: Bowel Sounds Present, Soft, Non Tender, Non-Distended
:Left arm AV fistula.  Functioning.  Baseline patient is an uric  No renal angle tenderness.  No suprapubic tenderness.
Extremities: .  No edema, Capillary Refill Less than 3 Seconds
Skin: No rashes, No breakdown
Musculoskeletal: No Tenderness to Palpation of Joints or Extremities
Neurological: Cranial nerves II-XII grossly intact, DTR  2+/4.  No acute focal neurological deficit.
Psych/Mental Status: Flat affect.

Weight / BMI
Weight

Weight:                        171 lb 15.369 oz                                  
Body Mass Index (BMI)          31.6                                              



ABG / Lab / Microbiology Data

03/12/24 07:24          

03/12/24 07:24          

Laboratory: 
Laboratory Results - last 24 hr

03/11/24 08:23: WBC 11.6 H, RBC 3.17 L, Hgb 9.5 L, Hct 30.3 L, MCV 95.6, MCH 30.0, MCHC 31.4 L, RDW Std Deviation 58.4 H, RDW Coeff of Mihir 16.5 H, Plt Count 345, MPV 9.0, Immature Gran % (Auto) 0.600, Neut % (Auto) 83.6 H, Lymph % (Auto) 7.2 L, Mono 
% (Auto) 6.6, Eos % (Auto) 1.7, Baso % (Auto) 0.3, Absolute Neuts (auto) 9.7 H, Absolute Lymphs (auto) 0.83, Nucleated RBC % 0, Sodium 136, Potassium 4.5, Chloride 94 L, Carbon Dioxide 29.0, Anion Gap 13, BUN 61 H, Creatinine 8.41 H*, Estim Creat 
Clear Calc 7.81, Est GFR (MDRD) Af Amer 6 L, Est GFR (MDRD) Non-Af 5 L, BUN/Creatinine Ratio 7.3 L, Glucose 113 H, Calcium 10.4 H, Phosphorus 8.5 H, Magnesium 2.8 H


Microbiology: 
Microbiology

03/11/24 14:50   Mucosa - Nose   Respiratory Panel (PCR) - Final
03/11/24 08:23   Mucosa - Nasopharyngeal   SARS-CoV-2, Influenza & RSV (PCR) - Final


Radiography
Diagnostic Testing: 
Radiology Impression

Chest X-Ray  03/11/24 07:59
IMPRESSION:
Bilateral infiltrations worse at the left lung base with small bilateral
pleural effusions superimposed on CHF.
 
Electronically Signed:
Harrison Klein MD
2024/03/11 at 10:27 EDT
Reading Location ID and State: 3 / [place]
Tel [phone], Service support  [phone], Fax [phone]
 




D/C Instructions
Discharge Diet: No restrictions
Weight Bearing Status: Weight bearing as tolerated
Call your doctor if you observe: Fever of 101 or Higher, Coldness, Increased Pain, Numbness or Tingling, Change in Color, Inability to urinate, Inability to have a bowel movement, Using more than 1 pad per hour, Shortness of breath, Dizziness, 
Fainting spells, Swelling in the ankles, Chest pain, Prolonged hiccupping, Increased palpitations (irregular heartbeat) and Calf discomfort
When: IN 2 WEEKS

Meaningful Use Info
Meaningful Use Diagnoses (Choose all that apply): None applicable

Discharge Plan
Admission
Admit Date/Time: 03/11/24 10:26

Primary Reason for Your Visit: Pneumonia, heart failure exacerbation

Attending Provider: Roc Johnson

Primary Care Provider: Billy Ramos

Consulting Providers: Rolf Monet

Discharge Orders/Prescriptions
Prescriptions:
New
  neomycin-polymyxin-HC 3.5-10,000-1 mg/mL-unit/mL-% Drops,Suspension 
   4 drp otic (ear) Q6H 7 Days Qty: 10 0RF
   Rx Instructions:
   Both ears
  levofloxacin 500 mg tablet 
   500 mg PO Q48H 6 Days Qty: 3 0RF

Continued
  cinacalcet [Sensipar] 60 mg tablet 
   60 mg PO DAILY 
  cholecalciferol (vitamin D3) 25 mcg (1,000 unit) capsule 
   100 mcg PO DAILY 
  levothyroxine 50 mcg tablet 
   50 mcg PO DAILY 
  citalopram 20 MG tablet 
   20 mg PO DAILY 
  albuterol sulfate 90 mcg/actuation HFA aerosol inhaler 
   2 puff inhalation Q6H PRN (Reason: shortness of breath or wheezing) Qty: 8.5 0RF
  lanthanum 1,000 mg tablet,chewable 
   1,000 mg PO TID 
  Renal Caps 1 mg capsule 
   1 cap PO DAILY 
  zinc acetate 50 mg (zinc) capsule 
   50 mg PO DAILY 
  amlodipine 10 mg tablet 
   10 mg PO DAILY 

Referrals / Follow Up:
Johann Noel MD [Med Staff - Active Staff] - Within 1 Week (Right ear pain, otitis media)
Rolf Monet MD [Med Staff - Consulting] - Within 1 Month
Billy Ramos DO [Primary Care Provider] - 
Don Cain MD [Med Staff - Active Staff] - Within 1 Month (For severe valvular heart disease)

Disposition
Disposition (needs filled in before D/C Order can be placed): Home, Self Care


Charges/Coding
Visit Charges
Inpatient E&M: 49642 Disch Hosp >30min

## 2024-03-12 NOTE — DCINST_ITS
Discharge Instructions    
Diet    
Discharge Diet: No restrictions    
Activity    
Discharge Activity: Return to Normal Activity    
Weight Bearing Status: Weight bearing as tolerated    
Dressing / Incision    
Call your doctor if you observe: Fever of 101 or Higher, Coldness, Increased   
Pain, Numbness or Tingling, Change in Color, Inability to urinate, Inability to   
have a bowel movement, Using more than 1 pad per hour, Shortness of breath,   
Dizziness, Fainting spells, Swelling in the ankles, Chest pain, Prolonged   
hiccupping, Increased palpitations (irregular heartbeat) and Calf discomfort    
Follow Up Care    
When: IN 2 WEEKS    
Test Results:     
Test results from this visit will be discussed in further detail at your follow-  
up appointment, if applicable.    
    
    
Discharge Plan    
Admission    
Admit Date/Time: 03/11/24 10:26    
    
Primary Reason for Your Visit: Pneumonia    
    
Attending Provider: Roc Johnson    
    
Primary Care Provider: Billy Ramos    
    
Consulting Providers: Rolf Monet    
    
Discharge Orders/Prescriptions    
Prescriptions:    
New    
  neomycin-polymyxin-HC 3.5-10,000-1 mg/mL-unit/mL-% Drops,Suspension     
   4 drp otic (ear) Q6H 7 Days Qty: 10 0RF    
   Rx Instructions:    
   Both ears    
  levofloxacin 500 mg tablet     
   500 mg PO Q48H 6 Days Qty: 3 0RF    
    
Continued    
  cinacalcet [Sensipar] 60 mg tablet     
   60 mg PO DAILY     
  cholecalciferol (vitamin D3) 25 mcg (1,000 unit) capsule     
   100 mcg PO DAILY     
  levothyroxine 50 mcg tablet     
   50 mcg PO DAILY     
  citalopram 20 MG tablet     
   20 mg PO DAILY     
  albuterol sulfate 90 mcg/actuation HFA aerosol inhaler     
   2 puff inhalation Q6H PRN (Reason: shortness of breath or wheezing) Qty: 8.5   
0RF    
  lanthanum 1,000 mg tablet,chewable     
   1,000 mg PO TID     
  Renal Caps 1 mg capsule     
   1 cap PO DAILY     
  zinc acetate 50 mg (zinc) capsule     
   50 mg PO DAILY     
  amlodipine 10 mg tablet     
   10 mg PO DAILY     
    
Referrals / Follow Up:    
Johann Noel MD [Med Staff - Active Staff] - Within 1 Week (Right ear   
pain, otitis media)    
Rolf Monet MD [Med Staff - Consulting] - Within 1 Month    
Billy Ramos DO [Primary Care Provider] -     
    
Disposition    
Disposition (needs filled in before D/C Order can be placed): Home, Self Care

## 2024-03-20 ENCOUNTER — DOCUMENTATION (OUTPATIENT)
Dept: TRANSPLANT | Facility: HOSPITAL | Age: 54
End: 2024-03-20
Payer: MEDICARE

## 2024-03-20 NOTE — PROGRESS NOTES
SW received VM from Pt inquiring about where to fax her annual PAP screen results to. SW returned Pt's phone call and was unable to speak with Pt. RC LVM to Pt providing her coordinator's fax number and encouraged Pt to reach out with any further questions/concerns. SW provided SW contact information.     Plan: SW will remain available.

## 2024-03-26 ENCOUNTER — HOSPITAL ENCOUNTER (OUTPATIENT)
Dept: RADIOLOGY | Facility: CLINIC | Age: 54
Discharge: HOME | End: 2024-03-26
Payer: MEDICARE

## 2024-03-26 DIAGNOSIS — Z01.818 PRE-TRANSPLANT EVALUATION FOR KIDNEY TRANSPLANT: ICD-10-CM

## 2024-03-26 PROCEDURE — 75571 CT HRT W/O DYE W/CA TEST: CPT

## 2024-04-08 PROBLEM — R19.5 OCCULT BLOOD IN STOOLS: Status: ACTIVE | Noted: 2024-04-08

## 2024-04-08 PROBLEM — E16.2 HYPOGLYCEMIA, UNSPECIFIED: Status: ACTIVE | Noted: 2019-03-22

## 2024-04-08 PROBLEM — F41.9 ANXIETY: Status: ACTIVE | Noted: 2019-02-27

## 2024-04-08 PROBLEM — D84.9 IMMUNODEFICIENCY (MULTI): Status: ACTIVE | Noted: 2019-02-27

## 2024-04-08 PROBLEM — E44.0 MODERATE PROTEIN-CALORIE MALNUTRITION (MULTI): Status: ACTIVE | Noted: 2021-08-15

## 2024-04-08 PROBLEM — R51.9 HEADACHE, UNSPECIFIED: Status: ACTIVE | Noted: 2019-03-22

## 2024-04-08 PROBLEM — Z86.69 HX OF MIGRAINES: Status: ACTIVE | Noted: 2019-02-27

## 2024-04-08 PROBLEM — E83.39 HYPERPHOSPHATEMIA: Status: ACTIVE | Noted: 2019-01-21

## 2024-04-08 PROBLEM — K21.00 GASTROESOPHAGEAL REFLUX DISEASE WITH ESOPHAGITIS WITHOUT HEMORRHAGE: Status: ACTIVE | Noted: 2022-05-23

## 2024-04-08 PROBLEM — E87.5 HYPERKALEMIA: Status: ACTIVE | Noted: 2020-08-12

## 2024-04-08 PROBLEM — K76.0 HEPATIC STEATOSIS: Status: ACTIVE | Noted: 2022-05-23

## 2024-04-08 PROBLEM — R93.5 ABNORMAL CT SCAN, PELVIS: Status: ACTIVE | Noted: 2017-10-10

## 2024-04-08 PROBLEM — M43.06 PARS DEFECT OF LUMBAR SPINE: Status: ACTIVE | Noted: 2017-10-10

## 2024-04-08 PROBLEM — G47.33 OBSTRUCTIVE SLEEP APNEA SYNDROME: Status: ACTIVE | Noted: 2023-07-03

## 2024-04-08 PROBLEM — R21 RASH AND NONSPECIFIC SKIN ERUPTION: Status: ACTIVE | Noted: 2019-01-25

## 2024-04-08 PROBLEM — I95.3 HYPOTENSION OF HEMODIALYSIS: Status: ACTIVE | Noted: 2019-06-25

## 2024-04-08 PROBLEM — R11.0 CHEMOTHERAPY-INDUCED NAUSEA: Status: ACTIVE | Noted: 2019-03-07

## 2024-04-08 PROBLEM — T45.1X5A CHEMOTHERAPY-INDUCED NAUSEA: Status: ACTIVE | Noted: 2019-03-07

## 2024-04-08 PROBLEM — J45.909 ASTHMA (HHS-HCC): Status: ACTIVE | Noted: 2024-04-08

## 2024-04-08 PROBLEM — Z99.2 END STAGE RENAL FAILURE ON DIALYSIS (MULTI): Status: ACTIVE | Noted: 2019-06-24

## 2024-04-08 PROBLEM — K29.30 CHRONIC SUPERFICIAL GASTRITIS WITHOUT BLEEDING: Status: ACTIVE | Noted: 2021-06-29

## 2024-04-08 PROBLEM — D68.9 COAGULATION DEFECT, UNSPECIFIED (MULTI): Status: ACTIVE | Noted: 2019-04-08

## 2024-04-08 PROBLEM — T45.1X5A PANCYTOPENIA DUE TO ANTINEOPLASTIC CHEMOTHERAPY (CMS-HCC): Status: ACTIVE | Noted: 2019-03-14

## 2024-04-08 PROBLEM — G43.909 MIGRAINES: Status: ACTIVE | Noted: 2018-06-12

## 2024-04-08 PROBLEM — R52 PAIN, UNSPECIFIED: Status: ACTIVE | Noted: 2019-03-22

## 2024-04-08 PROBLEM — L29.9 PRURITUS, UNSPECIFIED: Status: ACTIVE | Noted: 2019-06-12

## 2024-04-08 PROBLEM — J96.01 ACUTE RESPIRATORY FAILURE WITH HYPOXEMIA (MULTI): Status: ACTIVE | Noted: 2024-04-08

## 2024-04-08 PROBLEM — N18.6 END STAGE RENAL FAILURE ON DIALYSIS (MULTI): Status: ACTIVE | Noted: 2019-06-24

## 2024-04-08 PROBLEM — R06.02 SHORTNESS OF BREATH: Status: ACTIVE | Noted: 2021-01-09

## 2024-04-08 PROBLEM — K31.7 GASTRIC POLYPS: Status: ACTIVE | Noted: 2021-06-29

## 2024-04-08 PROBLEM — E87.20 METABOLIC ACIDOSIS: Status: ACTIVE | Noted: 2019-01-21

## 2024-04-08 PROBLEM — R50.9 FEVER, UNSPECIFIED: Status: ACTIVE | Noted: 2019-04-29

## 2024-04-08 PROBLEM — F41.1 GENERALIZED ANXIETY DISORDER: Status: ACTIVE | Noted: 2017-10-10

## 2024-04-08 PROBLEM — D61.810 PANCYTOPENIA DUE TO ANTINEOPLASTIC CHEMOTHERAPY (CMS-HCC): Status: ACTIVE | Noted: 2019-03-14

## 2024-04-08 PROBLEM — K90.9 INTESTINAL MALABSORPTION (HHS-HCC): Status: ACTIVE | Noted: 2022-06-01

## 2024-04-08 PROBLEM — R78.81 BACTEREMIA: Status: ACTIVE | Noted: 2019-09-24

## 2024-04-08 PROBLEM — I15.1 HYPERTENSION SECONDARY TO OTHER RENAL DISORDERS: Status: ACTIVE | Noted: 2019-03-27

## 2024-04-08 PROBLEM — R55 SYNCOPE: Status: ACTIVE | Noted: 2019-07-19

## 2024-04-08 PROBLEM — R94.5 ABNORMAL RESULTS OF LIVER FUNCTION STUDIES: Status: ACTIVE | Noted: 2019-12-23

## 2024-04-08 PROBLEM — A41.2: Status: ACTIVE | Noted: 2019-12-04

## 2024-04-08 PROBLEM — R19.7 DIARRHEA: Status: ACTIVE | Noted: 2019-03-01

## 2024-04-08 PROBLEM — M54.42 ACUTE MIDLINE LOW BACK PAIN WITH BILATERAL SCIATICA: Status: ACTIVE | Noted: 2017-10-10

## 2024-04-08 PROBLEM — R80.9 ALBUMINURIA: Status: ACTIVE | Noted: 2017-01-01

## 2024-04-08 PROBLEM — R10.13 ABDOMINAL PAIN, EPIGASTRIC: Status: ACTIVE | Noted: 2017-10-10

## 2024-04-08 PROBLEM — D64.89 ANEMIA DUE TO MULTIPLE MECHANISMS: Status: ACTIVE | Noted: 2019-01-21

## 2024-04-08 PROBLEM — M54.41 ACUTE MIDLINE LOW BACK PAIN WITH BILATERAL SCIATICA: Status: ACTIVE | Noted: 2017-10-10

## 2024-04-08 PROBLEM — E87.70 VOLUME OVERLOAD: Status: ACTIVE | Noted: 2019-02-26

## 2024-04-08 PROBLEM — I10 HYPERTENSION, ESSENTIAL: Status: ACTIVE | Noted: 2018-11-05

## 2024-04-08 PROBLEM — K44.9 HIATAL HERNIA: Status: ACTIVE | Noted: 2022-05-23

## 2024-04-08 PROBLEM — J18.9 PNEUMONIA: Status: ACTIVE | Noted: 2023-02-25

## 2024-04-08 PROBLEM — T78.40XA ALLERGY, UNSPECIFIED, INITIAL ENCOUNTER: Status: ACTIVE | Noted: 2021-07-23

## 2024-04-08 PROBLEM — Z99.2 DEPENDENCE ON HEMODIALYSIS (CMS-HCC): Status: ACTIVE | Noted: 2019-07-29

## 2024-04-08 PROBLEM — N18.9 CHRONIC KIDNEY DISEASE, UNSPECIFIED: Status: ACTIVE | Noted: 2019-03-21

## 2024-04-08 PROBLEM — R73.03 PRE-DIABETES: Status: ACTIVE | Noted: 2022-05-23

## 2024-04-08 PROBLEM — E85.81 LIGHT CHAIN (AL) AMYLOIDOSIS (MULTI): Status: ACTIVE | Noted: 2019-02-26

## 2024-04-08 PROBLEM — N17.9 ACUTE RENAL FAILURE (CMS-HCC): Status: ACTIVE | Noted: 2018-11-05

## 2024-04-08 PROBLEM — R40.0 DAYTIME SLEEPINESS: Status: ACTIVE | Noted: 2021-04-01

## 2024-04-08 PROBLEM — D62 ACUTE BLOOD LOSS ANEMIA: Status: ACTIVE | Noted: 2022-06-08

## 2024-04-08 PROBLEM — E61.7 DEFICIENCY OF MULTIPLE NUTRIENT ELEMENTS: Status: ACTIVE | Noted: 2022-06-01

## 2024-04-08 PROBLEM — E66.01 MORBID (SEVERE) OBESITY DUE TO EXCESS CALORIES (MULTI): Status: ACTIVE | Noted: 2022-05-31

## 2024-04-08 PROBLEM — R12 HEARTBURN: Status: ACTIVE | Noted: 2021-04-01

## 2024-04-08 PROBLEM — I16.0 HYPERTENSIVE URGENCY: Status: ACTIVE | Noted: 2019-02-26

## 2024-04-08 PROBLEM — T78.2XXA ANAPHYLACTIC SHOCK, UNSPECIFIED, INITIAL ENCOUNTER: Status: ACTIVE | Noted: 2021-07-23

## 2024-04-08 PROBLEM — Z94.81 S/P AUTOLOGOUS BONE MARROW TRANSPLANTATION (MULTI): Status: ACTIVE | Noted: 2019-02-26

## 2024-04-08 PROBLEM — T80.212A CENTRAL LINE INSERTION SITE INFECTION: Status: ACTIVE | Noted: 2019-03-21

## 2024-04-08 PROBLEM — N25.81 SECONDARY HYPERPARATHYROIDISM OF RENAL ORIGIN (MULTI): Status: ACTIVE | Noted: 2019-04-22

## 2024-04-08 RX ORDER — ASCORBIC ACID, THIAMINE MONONITRATE,RIBOFLAVIN, NIACINAMIDE, PYRIDOXINE HYDROCHLORIDE, FOLIC ACID, CYANOCOBALAMIN, BIOTIN, CALCIUM PANTOTHENATE, 100; 1.5; 1.7; 20; 10; 1; 6000; 150000; 5 MG/1; MG/1; MG/1; MG/1; MG/1; MG/1; UG/1; UG/1; MG/1
1 CAPSULE, LIQUID FILLED ORAL
COMMUNITY
Start: 2024-02-21

## 2024-04-08 RX ORDER — ALBUTEROL SULFATE 90 UG/1
AEROSOL, METERED RESPIRATORY (INHALATION)
COMMUNITY

## 2024-04-09 ENCOUNTER — CONSULT (OUTPATIENT)
Dept: CARDIOLOGY | Facility: HOSPITAL | Age: 54
End: 2024-04-09
Payer: COMMERCIAL

## 2024-04-09 VITALS
WEIGHT: 177.6 LBS | BODY MASS INDEX: 32.68 KG/M2 | HEIGHT: 62 IN | OXYGEN SATURATION: 92 % | SYSTOLIC BLOOD PRESSURE: 143 MMHG | HEART RATE: 88 BPM | DIASTOLIC BLOOD PRESSURE: 85 MMHG

## 2024-04-09 DIAGNOSIS — I10 HYPERTENSION, ESSENTIAL: ICD-10-CM

## 2024-04-09 DIAGNOSIS — R06.02 SHORTNESS OF BREATH: ICD-10-CM

## 2024-04-09 DIAGNOSIS — I05.0 MITRAL VALVE STENOSIS, UNSPECIFIED ETIOLOGY: ICD-10-CM

## 2024-04-09 DIAGNOSIS — I25.10 CORONARY ARTERY CALCIFICATION SEEN ON CT SCAN: Primary | ICD-10-CM

## 2024-04-09 PROCEDURE — 99204 OFFICE O/P NEW MOD 45 MIN: CPT | Performed by: INTERNAL MEDICINE

## 2024-04-09 PROCEDURE — 93005 ELECTROCARDIOGRAM TRACING: CPT | Performed by: INTERNAL MEDICINE

## 2024-04-09 PROCEDURE — 99214 OFFICE O/P EST MOD 30 MIN: CPT | Performed by: INTERNAL MEDICINE

## 2024-04-09 PROCEDURE — 93010 ELECTROCARDIOGRAM REPORT: CPT | Performed by: INTERNAL MEDICINE

## 2024-04-09 RX ORDER — ROSUVASTATIN CALCIUM 10 MG/1
10 TABLET, COATED ORAL DAILY
Qty: 30 TABLET | Refills: 11 | Status: SHIPPED | OUTPATIENT
Start: 2024-04-09 | End: 2025-04-09

## 2024-04-09 RX ORDER — ASCORBIC ACID 250 MG
250 TABLET ORAL DAILY
COMMUNITY

## 2024-04-09 ASSESSMENT — COLUMBIA-SUICIDE SEVERITY RATING SCALE - C-SSRS
2. HAVE YOU ACTUALLY HAD ANY THOUGHTS OF KILLING YOURSELF?: NO
6. HAVE YOU EVER DONE ANYTHING, STARTED TO DO ANYTHING, OR PREPARED TO DO ANYTHING TO END YOUR LIFE?: NO
1. IN THE PAST MONTH, HAVE YOU WISHED YOU WERE DEAD OR WISHED YOU COULD GO TO SLEEP AND NOT WAKE UP?: NO

## 2024-04-09 ASSESSMENT — PAIN SCALES - GENERAL: PAINLEVEL: 0-NO PAIN

## 2024-04-09 NOTE — PATIENT INSTRUCTIONS
Thank you for attending the cardiology clinic at [facility] today.  It was nice to meet you.    Your cardiovascular examination today was notable for a heart murmur but was otherwise within normal limits.  Your EKG showed a normal heart rhythm.    Your elevated calcium score is indicative of plaque in the heart arteries.  As the stress test was normal there is no evidence that this is blocking blood flow.    I will prescribe rosuvastatin 10 mg daily for management of the coronary plaque.    Please call [phone] to schedule your echocardiogram.  Your previous study showed evidence of valve dysfunction which I will review with our valve team.    Please have blood drawn for lab work.    I will follow-up with you in clinic after your investigations.

## 2024-04-09 NOTE — PROGRESS NOTES
Subjective   Rekha Gonzáles is a 53 y.o. 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     Investigations:  TTE (2023, [facility]) - LVEF 63%, mixed mitral valve disease: moderate mitral regurgitation, moderate-severe mitral stenosis (mean gradient 14 mmHg), severe MAC, normal RVSP, tricuspid aortic valve with moderate aortic stenosis (mean gradient 23 mmHg, JACKLYN 1.69 cm2).   CACS (3/26/2024) - 449 (LM 0, , Lcx 110, RCA 0).   Nuclear stress test (3/14/2023) - normal perfusion/no ischemia, no scar.     Chief Complaint:  Renal transplant work-up.     HPI  No chest pain.   PNA - March, sedentary since. Hospitalized for one night. Subsequent ear infection.   Prior to PNA would attend gym 2x/week  - exercise bike for 20 min, lift weights.  Trace ankle swelling only.   No orthopnea or PND.   No palpitations or syncope.    CV risk:  HTN - controlled on amlodipine 10 mg daily. Monitor at home 120s/80s mmHg.   Non-smoker.   Lipid - check  Fam hx - father had valve replacement AVR.   Not diabetic.    ROS  A 10-system review was performed and was unremarkable apart from what is presented in the HPI.     Objective   Physical Exam  Alert and orientated.   Appropriate responses, normal affect.  No respiratory distress at rest.   Skin warm and dry.   Normal radial pulse character and volume. Clinically SR.   Anicteric sclera, no conjunctival pallor.   No JVD or carotid bruits.  Heart sounds dual, audible ESM/PSM.   Chest clear on auscultation.   Calves soft, non-tender.  No pedal edema.  EKG - SR, LAE.    Lab Review:   Lab Results   Component Value Date    BUN 33 (H) 02/06/2024    CREATININE 5.71 (H) 02/06/2024  "    No results found for: \"CKTOTAL\", \"CKMB\", \"CKMBINDEX\", \"TROPONINI\"  Lab Results   Component Value Date    WBC 6.8 02/06/2024    HGB 10.7 (L) 02/06/2024    HCT 33.8 (L) 02/06/2024    MCV 97 02/06/2024     02/06/2024     No results found for: \"CHOL\", \"TRIG\", \"HDL\", \"LDLDIRECT\"    Assessment/Plan   In summary, Rekha Gonzáles is a 53 y.o. 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  She is active and reports good exercise capacity [attending the gym regularly for aerobic and resistance training] prior to developing pneumonia a few weeks ago.  She has previously undergone treatment including autologous stem cell transplant for AL amyloidosis, where there has been no documented cardiac involvement.  Her echocardiogram performed at [facility] is notable for normal LV systolic function but significant valvular disease including mixed mitral valve disease [moderate-severe MS and moderate MR] and moderate aortic stenosis with a normal documented RVSP.  There is no history of congestive heart failure.  On examination today she was euvolemic with a prominent ejection systolic murmur.  There was no evidence of JVD or lower limb edema. Her EKG showed sinus rhythm with evidence of left atrial enlargement.  At this point I will arrange for a repeat echocardiogram to reassess her valve function.  Her coronary calcium score was 400 and I have commenced her on rosuvastatin 10 mg daily for primary prevention of CVD.  There is no current evidence of obstructive CAD as her nuclear stress test was negative.  Significant mitral stenosis is noted on her TTE I will arrange for LHC/RHC and valve team input.  I will " follow-up with Mrs. Gonzáles after her investigations.

## 2024-04-15 LAB
ATRIAL RATE: 82 BPM
P AXIS: 73 DEGREES
P OFFSET: 189 MS
P ONSET: 133 MS
PR INTERVAL: 170 MS
Q ONSET: 218 MS
QRS COUNT: 13 BEATS
QRS DURATION: 88 MS
QT INTERVAL: 394 MS
QTC CALCULATION(BAZETT): 460 MS
QTC FREDERICIA: 437 MS
R AXIS: 84 DEGREES
T AXIS: 59 DEGREES
T OFFSET: 415 MS
VENTRICULAR RATE: 82 BPM

## 2024-05-09 NOTE — H&P (VIEW-ONLY)
(Elements copied from my note dated 9/22/2022 have been reviewed and updated where appropriate, and all reflect current assessment and medical decision making during today's encounter, 5/9/2024)                             [facility]  Plasma Cell Disorder Clinic       Rekha Gonzáles is a 53 year old female patient.    Reason for visit: Follow-up for AL amyloidosis. My recommendations to the consult requesting physician are communicated via the shared electronic medical record or US mail.    Baseline assessment on initial diagnosis date June / 2018:  IMWG criteria, [place] Journal of Haematology 121: 749-57, 2003, update in:  Brock et al. Leukemia 20: 1467-73, 2006 and at IMW meeting Zoë 2011  AL amyloidosis (kappa), organ manifestation clinically:   Kidney, biopsy proven in:  Kidney  Related Organ or Tissue Involvement (CRAB) or other Myeloma Defining Event (MDE): renal impairment  Antecedent plasma cell dyscrasia: No  MM 70 gene expression (MyPRS) score:  (<45.2: low risk; > 45.2: high risk), molecular subgroup: N/A  Myeloma FISH panel: no clonal plasma cells in marrow  Cytogenetics: no clonal plasma cells  Monoclonal proteins at diagnosis: Serum M-spike: 0 gm/dL, Involved serum free light chains: 55.4 mg/L, Uninvolved serum free light chains: 23.3 mg/L, Urinary M-protein: 0 g/24hrs.with negative serum immunofixation, Urinary protein excretion: 11.26 g/24hrs and Urinary albumin: 71 %. Negative urine immunofixation  Total immunoglobulins at diagnosis: IgG 617 mg/dL, IgA 481 mg/dL, IgM 51mg/dL  Bone marrow plasma cell infiltration: 0 % clonal    Systemic treatment and disease course:  Non-quantifiable hematologic response as dFLC < 40 at diagnosis    - Start 8/10/18 until 9/21/18:  CyBorD.  PD by 24 hour urine protein    - Start 10/24/18 until 1/16/19: daratumumab single agent.  PD by 24 hour urine protein with progression to dialysis dependence.  But bone marrow without clonal plasma cells     -  Start 3/6/19: melphalan 140 mg/m2 and ASCT.  CR    Local treatments (radiation, surgery, kyphoplasty)  - None      Interval:  Ms. Gonzáles presents today for follow up of AL amyloidosis. She is overall feeling well. She continues hemodialysis. No new bone pain.  No edema.     Review of systems:  General: No fever , No chills and No night sweats  HEENT:  No lumps, no difficulty chewing or swallowing, no enlarging tongue, no tooth aches.  Musculoskeletal: No Pain  Hematological: No bleeding or easy bruising.   Lymphatic / Immune system: No lymph node enlargement.    Cardiovascular:  No orthopnea, no dyspnea, no chest pain, no leg edema, no palpitations.   Pulmonary:  No dyspnea, no wheezing, no cough.   Gastrointestinal: No nausea, vomitting, diarrhea, constipation, abdominal pain, or blood in stool.   Genitourinary:  Urine output:  scant, Blood in urine:  no and Urinary incontinence:  none  Neurological:  Numbness in fingertips but no longer in feet, no painful paresthesias or weakness.   Skin: No rash or pruritis.      Allergies / intolerances:  ALLERGIES   Allergen Reactions   • Adhesive Tape-Silic* Rash       Medications:  • ALPRAZolam (XANAX) 0.5 mg tablet^Take 1 tablet by mouth two times a day as needed for anxiety or sedation for up to 30 days.^Disp: 45 tablet^Rfl: 2    • citalopram (CELEXA) 20 mg tablet^Take 1 tablet by mouth once daily.^Disp: 90 tablet^Rfl: 0    • levothyroxine (LEVOXYL) 50 mcg tablet^Take 1 tablet by mouth once daily. Take on empty stomach. For Thyroid^Disp: 90 tablet^Rfl: 1    • RENAL CAPS 1 mg capsule^Take 1 capsule by mouth every afternoon.^Disp: ^Rfl:     • albuterol HFA (PROVENTIL HFA, VENTOLIN HFA) 90 mcg/actuation inhaler^Inhale 2 Puffs as instructed every 4 hours as needed for wheezing/shortness of breath.^Disp: 8 g^Rfl: 3    • amLODIPine (NORVASC) 10 mg tablet^Take 1 tablet by mouth once daily.^Disp: 90 tablet^Rfl: 1    • lanthanum (FOSRENOL) 1,000 mg chewable tablet^CHEW & SWALLOW 1  "TABLET BY MOUTH THREE TIMES DAILY WITH EACH MEAL^Disp: ^Rfl:     • omeprazole (PRILOSEC) 20 mg capsule^TAKE 1 CAPSULE (20 MG) BY MOUTH DAILY. DO NOT CRUSH OR CHEW.^Disp: ^Rfl:     • AURYXIA 210 mg iron tab^TAKE 2 TABLETS BY MOUTH THREE TIMES A DAY WITH MEALS. SWALLOW WHOLE, DO NOT CHEW OR CRUSH MEDICATION^Disp: ^Rfl:   (Patient not taking: Reported on 12/14/2023)   • cyanocobalamin, vitamin B-12, (VITAMIN B-12 ORAL)^Take by mouth.^Disp: ^Rfl:     • cinacalcet HCl (CINACALCET ORAL)^Take 90 mg by mouth once daily.^Disp: ^Rfl:   (Patient not taking: Reported on 12/14/2023)   • folic acid/vit B complex and C (RENAL VITAMIN ORAL)^Take by mouth.^Disp: ^Rfl:     • Zinc 50 mg tab^Take by mouth.^Disp: ^Rfl:     • Cholecalciferol, Vitamin D3, (VITAMIN D) 25 mcg (1,000 unit) cap^Take 2 capsules by mouth once daily.^Disp: ^Rfl:         Physical examination:  /77   Pulse 79   Temp 36.3 °C (97.4 °F) (Temporal)   Resp 20   Ht 157 cm (5' 1.81\")   Wt 79.8 kg (175 lb 14.8 oz)   LMP 02/28/2011   SpO2 100%   BMI 32.37 kg/m²   ECOG PS: 1- Restricted in physically strenuous activity.  Carries out light duty.  General appearance: Well appearing, alert, in no acute distress, well-hydrated, well nourished.  HEENT: No lumps, no macroglossia, no icterus. Mucous membranes pink.    Neck: Supple, no adenopathy; thyroid symmetric, normal size, no bruits  Back:  no pain to palpation  Lungs: Lungs clear to auscultation. No wheezing, rhonchi, rales  Heart: RRR without murmur, gallop, or rubs.  No ectopy  Abdomen:  Abdomen soft, non-tender. Bowel sounds normal. No masses, organomegaly  Extremities:  No deformities, clubbing or cyanosis. Only trace bilateral LE edema at ankles  Musculoskeletal: No joint swelling, deformity, or tenderness  Neuro: Alert and oriented x 3. Cranial nerves 2-12 grossly intact.  Motor and sensation grossly intact.  Gait stable.   Skin:  no rashes, lesions, or jaundice  Plasmacytomas:  No    Laboratory " tests:  WBC   Date Value   05/07/2024 9.75 k/uL   01/05/2024 6.48 k/uL   08/24/2023 7.48 k/uL   05/26/2023 8.30 k/uL   04/27/2023 7.14 k/uL   02/09/2023 7.44 k/uL   01/31/2023 9.15 k/uL   11/10/2022 5.87 k/uL   08/27/2022 5.64 k/uL   05/12/2022 8.25 k/uL   01/20/2022 10.41 k/uL   10/07/2021 8.27 k/uL   09/17/2021 10.99 k/uL   07/06/2021 8.87 k/uL   04/07/2021 8.77 k/uL   12/23/2020 8.53 k/uL   09/09/2020 9.40 k/uL   06/09/2020 7.51 k/uL   03/07/2020 3.55 k/uL   09/28/2019 8.83 thou/cmm     Abs Neut (ANC) (k/uL)   Date Value   10/07/2021 4.78   07/06/2021 5.17   04/07/2021 6.26   12/23/2020 5.72   09/09/2020 6.89   06/09/2020 4.74   03/07/2020 1.87   08/26/2019 3.75   05/29/2019 4.81   04/16/2019 5.82     Abs Neut  (k/uL)   Date Value   05/07/2024 7.18   01/05/2024 4.49   08/24/2023 4.61   04/27/2023 4.42   01/31/2023 6.13   08/27/2022 3.10   05/12/2022 5.12     Hemoglobin (g/dL)   Date Value   05/07/2024 10.0   01/05/2024 11.3   08/24/2023 11.2   05/26/2023 12.4   04/27/2023 12.0   02/09/2023 11.1   01/31/2023 11.5   11/10/2022 13.2   08/27/2022 15.9   05/12/2022 11.4   01/20/2022 12.5   10/07/2021 11.3   09/17/2021 10.8   07/06/2021 12.2   04/07/2021 13.1   12/23/2020 12.6   09/09/2020 12.1   06/09/2020 12.4   03/07/2020 11.5   08/26/2019 12.7     HGB (g/dL)   Date Value   09/28/2019 9.0   09/26/2019 8.8   09/25/2019 8.4   09/24/2019 9.1     Platelet Count   Date Value   05/07/2024 382 k/uL   01/05/2024 260 k/uL   08/24/2023 241 k/uL   05/26/2023 262 k/uL   04/27/2023 265 k/uL   02/09/2023 289 k/uL   01/31/2023 302 k/uL   11/10/2022 206 k/uL   08/27/2022 228 k/uL   05/12/2022 316 k/uL   01/20/2022 361 k/uL   10/07/2021 280 k/uL   09/17/2021 329 k/uL   07/06/2021 249 k/uL   04/07/2021 271 k/uL   12/23/2020 283 k/uL   09/09/2020 285 k/uL   06/09/2020 291 k/uL   03/07/2020 210 k/uL   09/28/2019 277 thou/cmm     Glucose (mg/dL)   Date Value   05/07/2024 82   01/05/2024 118   08/24/2023 102   05/26/2023 96   04/27/2023  144   02/09/2023 101   01/31/2023 133   11/10/2022 146   08/27/2022 94   05/12/2022 106   01/20/2022 108   10/07/2021 103   09/17/2021 107   07/06/2021 115   04/07/2021 128   12/23/2020 109   09/09/2020 95   06/09/2020 96   03/07/2020 94   11/15/2019 145     Creatinine (mg/dL)   Date Value   05/07/2024 4.99   01/05/2024 2.91   08/24/2023 5.44   05/26/2023 3.03   04/27/2023 6.25   02/09/2023 5.80   01/31/2023 6.11   11/10/2022 5.41   08/27/2022 5.51   05/12/2022 6.43   01/20/2022 6.55   10/07/2021 7.03   09/17/2021 3.05   07/06/2021 7.81   04/07/2021 3.56   12/23/2020 3.94   09/09/2020 3.73   06/09/2020 7.36   03/07/2020 7.24   11/15/2019 7.90     Calcium (mg/dL)   Date Value   01/20/2022 10.7   10/07/2021 9.8   09/17/2021 9.6   07/06/2021 9.5   04/07/2021 9.2   12/23/2020 9.3   09/09/2020 9.3   06/09/2020 10.4   03/07/2020 9.2   11/15/2019 9.9     Calcium, Total (mg/dL)   Date Value   05/07/2024 10.1   01/05/2024 9.7   08/24/2023 9.7   05/26/2023 8.7   04/27/2023 8.9   02/09/2023 9.1   01/31/2023 9.2   11/10/2022 9.7   08/27/2022 11.4   05/12/2022 9.5     M-Protein Concentration   Date Value   01/31/2023 0.00 g/dL   05/12/2022 0.00 g/dL   10/07/2021 0.00 gm/dL   07/06/2021 0.00 gm/dL   04/07/2021 0.00 gm/dL   09/09/2020 0.00 gm/dL   06/09/2020 0.00 gm/dL   03/07/2020 0.00 gm/dL   11/15/2019 0.00 gm/dL   08/26/2019 0.00 gm/dL   05/29/2019 0.14 gm/dL   05/02/2019 0.00 gm/dL   02/13/2019 0.00 gm/dL   12/12/2018 0.07 gm/dL   11/13/2018 0.04 gm/dL   10/04/2018 0.00 gm/dL   02/14/2018 0.00 gm/dL     M Benny Quant, 24 Hr Urine (gm/24 Hr)   Date Value   12/21/2018 >1.16   11/12/2018 Duplicate request   11/12/2018 0.00   10/08/2018 0.00   08/06/2018 0.00   02/16/2018 0.00     Kappa Free, Serum (mg/L)   Date Value   05/07/2024 227.3   01/05/2024 157.8   08/24/2023 194.6   04/27/2023 229.2   01/31/2023 216.6   08/27/2022 169.2   05/12/2022 242.3   01/20/2022 199.4   10/07/2021 185.3   07/06/2021 176.7   04/07/2021 189.2    12/23/2020 237.9   09/09/2020 205.7   06/09/2020 238.9   03/07/2020 320.7   11/15/2019 198.4   08/26/2019 224.9   05/29/2019 187.1   05/02/2019 171.0   02/13/2019 67.8   12/12/2018 45.2   12/05/2018 49.5   11/13/2018 38.4   10/04/2018 56.4   07/31/2018 55.4   02/14/2018 37.5     Lambda Free, Serum (mg/L)   Date Value   05/07/2024 257.5   01/05/2024 141.1   08/24/2023 162.5   04/27/2023 162.4   01/31/2023 153.7   08/27/2022 141.3   05/12/2022 176.3   01/20/2022 157.9   10/07/2021 173.2   07/06/2021 174.9   04/07/2021 157.3   12/23/2020 187.8   09/09/2020 149.4   06/09/2020 154.8   03/07/2020 171.2   11/15/2019 108.5   08/26/2019 156.4   05/29/2019 117.0   05/02/2019 111.0   02/13/2019 31.2   12/12/2018 18.2   12/05/2018 22.1   11/13/2018 16.3   10/04/2018 23.3   02/14/2018 22.8     MPA IgG, Serum (mg/dL)   Date Value   01/20/2022 1,580   10/07/2021 1,378   07/06/2021 1,333   04/07/2021 1,484   12/23/2020 1,505   09/09/2020 1,500   06/09/2020 1,360   03/07/2020 1,150   11/15/2019 1,320   08/26/2019 1,520   05/29/2019 1,380   02/13/2019 248   12/12/2018 274   12/05/2018 329   11/13/2018 218   10/04/2018 378   07/31/2018 617   02/14/2018 894     MPA IgA, Serum (mg/dL)   Date Value   01/20/2022 415   10/07/2021 352   07/06/2021 316   04/07/2021 342   12/23/2020 350   09/09/2020 313   06/09/2020 303   03/07/2020 196   11/15/2019 196   08/26/2019 207   05/29/2019 295   02/13/2019 179   12/12/2018 134   12/05/2018 146   11/13/2018 126   10/04/2018 323   07/31/2018 481   02/14/2018 558     MPA IgM, Serum (mg/dL)   Date Value   01/20/2022 55   10/07/2021 67   07/06/2021 45   04/07/2021 40   12/23/2020 40   09/09/2020 34   06/09/2020 50   03/07/2020 35   11/15/2019 18   08/26/2019 58   05/29/2019 36   02/13/2019 25   12/12/2018 31   12/05/2018 27   11/13/2018 31   10/04/2018 35   07/31/2018 51   02/14/2018 58     Interpretation (MPA) (no units)   Date Value   06/09/2020 SEE COMMENT   05/29/2019 SEE COMMENT   12/12/2018 SEE  COMMENT   12/05/2018 SEE COMMENT   11/13/2018 SEE COMMENT         Impression & Plan:  AL (kappa) amyloidosis: Initially unable assess hematologic response due to baseline dFLC <40, but markers have been stable on observation. Currently on HD which allows a higher kappa and lambda serum FLC given that FLC are not cleared by dialysis. Free light chain ratio stable/normal so no concerns for progression from a biochemical standpoint. Continue to monitor off therapy.      Hematology: anemia stable.  AURELIO per dialysis.      Renal: Dialysis dependence currently from the amyloidosis.  Suitable candidate from amyloidosis standpoint for kidney allograft.      Infectious diseases: no issues.    Insomnia: zolpidem as needed.    Next follow-up in 3 months.     Horace Colmenares MD

## 2024-05-14 ENCOUNTER — HOSPITAL ENCOUNTER (OUTPATIENT)
Dept: CARDIOLOGY | Facility: HOSPITAL | Age: 54
Discharge: HOME | End: 2024-05-14
Payer: COMMERCIAL

## 2024-05-14 ENCOUNTER — LAB (OUTPATIENT)
Dept: LAB | Facility: LAB | Age: 54
End: 2024-05-14
Payer: COMMERCIAL

## 2024-05-14 DIAGNOSIS — I25.10 CORONARY ARTERY CALCIFICATION SEEN ON CT SCAN: ICD-10-CM

## 2024-05-14 DIAGNOSIS — I05.0 MITRAL VALVE STENOSIS, UNSPECIFIED ETIOLOGY: ICD-10-CM

## 2024-05-14 DIAGNOSIS — Z01.818 PRE-TRANSPLANT EVALUATION FOR KIDNEY TRANSPLANT: ICD-10-CM

## 2024-05-14 DIAGNOSIS — R06.02 SHORTNESS OF BREATH: ICD-10-CM

## 2024-05-14 DIAGNOSIS — Z01.810 ENCOUNTER FOR PREPROCEDURAL CARDIOVASCULAR EXAMINATION: ICD-10-CM

## 2024-05-14 LAB
ABO GROUP (TYPE) IN BLOOD: NORMAL
AORTIC VALVE MEAN GRADIENT: 18.8 MMHG
AORTIC VALVE PEAK VELOCITY: 2.8 M/S
AV PEAK GRADIENT: 31.3 MMHG
AVA (PEAK VEL): 1.25 CM2
AVA (VTI): 1.13 CM2
CHOLEST SERPL-MCNC: 108 MG/DL (ref 0–199)
CHOLESTEROL/HDL RATIO: 2.4
EJECTION FRACTION APICAL 4 CHAMBER: 64.4
HDLC SERPL-MCNC: 44.1 MG/DL
LDLC SERPL CALC-MCNC: 46 MG/DL
LEFT ATRIUM VOLUME AREA LENGTH INDEX BSA: 46.4 ML/M2
LEFT VENTRICLE INTERNAL DIMENSION DIASTOLE: 3.98 CM (ref 3.5–6)
LEFT VENTRICULAR OUTFLOW TRACT DIAMETER: 1.66 CM
LV EJECTION FRACTION BIPLANE: 63 %
MITRAL VALVE E/A RATIO: 1.28
MITRAL VALVE E/E' RATIO: 44.99
NON HDL CHOLESTEROL: 64 MG/DL (ref 0–149)
RH FACTOR (ANTIGEN D): NORMAL
RIGHT VENTRICLE FREE WALL PEAK S': 12 CM/S
RIGHT VENTRICLE PEAK SYSTOLIC PRESSURE: 78.7 MMHG
TRICUSPID ANNULAR PLANE SYSTOLIC EXCURSION: 1.5 CM
TRIGL SERPL-MCNC: 88 MG/DL (ref 0–149)
VLDL: 18 MG/DL (ref 0–40)

## 2024-05-14 PROCEDURE — 83880 ASSAY OF NATRIURETIC PEPTIDE: CPT

## 2024-05-14 PROCEDURE — 86900 BLOOD TYPING SEROLOGIC ABO: CPT

## 2024-05-14 PROCEDURE — 86901 BLOOD TYPING SEROLOGIC RH(D): CPT

## 2024-05-14 PROCEDURE — 93306 TTE W/DOPPLER COMPLETE: CPT | Performed by: INTERNAL MEDICINE

## 2024-05-14 PROCEDURE — 93306 TTE W/DOPPLER COMPLETE: CPT

## 2024-05-14 PROCEDURE — 83945 ASSAY OF OXALATE: CPT

## 2024-05-14 PROCEDURE — 80061 LIPID PANEL: CPT

## 2024-05-14 PROCEDURE — 36415 COLL VENOUS BLD VENIPUNCTURE: CPT

## 2024-05-15 ENCOUNTER — TELEPHONE (OUTPATIENT)
Dept: TRANSPLANT | Facility: HOSPITAL | Age: 54
End: 2024-05-15
Payer: MEDICARE

## 2024-05-15 DIAGNOSIS — I34.2 NONRHEUMATIC MITRAL VALVE STENOSIS: Primary | ICD-10-CM

## 2024-05-15 DIAGNOSIS — I50.30 DIASTOLIC CONGESTIVE HEART FAILURE, UNSPECIFIED HF CHRONICITY (MULTI): ICD-10-CM

## 2024-05-15 LAB — BNP SERPL-MCNC: 498 PG/ML (ref 0–99)

## 2024-05-15 NOTE — TELEPHONE ENCOUNTER
I called the patient to update her on her evaluation progress.  I asked the patient if she had a pap smear at her March GYN appointment and she said she did not because per the physician she was not due yet.  She did have an abdominal Ultrasound at [facility] which she said came back negative. Patient confirmed that she got blood work done and is waiting for a call back from the cardiologist for further instructions.  Patient denied any further questions.

## 2024-05-16 PROBLEM — I50.30 DIASTOLIC CONGESTIVE HEART FAILURE (MULTI): Status: ACTIVE | Noted: 2024-05-15

## 2024-05-16 PROBLEM — I34.2 NONRHEUMATIC MITRAL VALVE STENOSIS: Status: ACTIVE | Noted: 2024-05-15

## 2024-05-18 LAB — OXALATE SERPL-SCNC: 40.8 UMOL/L

## 2024-05-28 ENCOUNTER — TELEPHONE (OUTPATIENT)
Dept: TRANSPLANT | Facility: HOSPITAL | Age: 54
End: 2024-05-28
Payer: MEDICARE

## 2024-05-28 ENCOUNTER — DOCUMENTATION (OUTPATIENT)
Dept: TRANSPLANT | Facility: HOSPITAL | Age: 54
End: 2024-05-28
Payer: MEDICARE

## 2024-05-28 NOTE — TELEPHONE ENCOUNTER
I called this patient to update her on her evaluation progress.  We discussed her upcoming Left and Right Heart Cath.  The patient is concerned that the test results will prevent her from getting listed.  I reassured the patient, that though there may be a delay in listing because of the test results, having a left and right heart cath does not mean that she wont be listed.    I also informed her that we need her to see transplant psych.  Patient understands that we will call her to make the appointment.  Patient denied any further questions.

## 2024-05-28 NOTE — PROGRESS NOTES
Progress Notes  - documented in this encounter  Horace Colmenares MD - 03/08/2024 11:51 AM EST  Formatting of this note might be different from the original.  Regarding kidney transplant.    Ms. Gonzáles has been in a hematologic Complete Response since high dose melphalan and autologous stem cell transplant performed on March 6 2019. She is now more than 5 years out from this treatment and remains in remission.    From my standpoint, she is a suitable candidate for kidney allograft.    Horace Colmenares MD    Electronically signed by Horace Colmenares MD at 03/08/2024 11:53 AM EST

## 2024-06-04 ENCOUNTER — TELEPHONE (OUTPATIENT)
Dept: TRANSPLANT | Facility: HOSPITAL | Age: 54
End: 2024-06-04
Payer: MEDICARE

## 2024-06-05 RX ORDER — ALPRAZOLAM 0.5 MG/1
1 TABLET ORAL 2 TIMES DAILY PRN
COMMUNITY
Start: 2024-05-08

## 2024-06-06 ENCOUNTER — APPOINTMENT (OUTPATIENT)
Dept: CARDIOLOGY | Facility: HOSPITAL | Age: 54
End: 2024-06-06
Payer: MEDICARE

## 2024-06-06 ENCOUNTER — HOSPITAL ENCOUNTER (OUTPATIENT)
Facility: HOSPITAL | Age: 54
Setting detail: OUTPATIENT SURGERY
Discharge: HOME | End: 2024-06-06
Attending: INTERNAL MEDICINE | Admitting: INTERNAL MEDICINE
Payer: MEDICARE

## 2024-06-06 VITALS
HEART RATE: 93 BPM | SYSTOLIC BLOOD PRESSURE: 144 MMHG | RESPIRATION RATE: 16 BRPM | DIASTOLIC BLOOD PRESSURE: 79 MMHG | OXYGEN SATURATION: 93 %

## 2024-06-06 DIAGNOSIS — I34.2 NONRHEUMATIC MITRAL VALVE STENOSIS: Primary | ICD-10-CM

## 2024-06-06 DIAGNOSIS — I50.30 DIASTOLIC CONGESTIVE HEART FAILURE, UNSPECIFIED HF CHRONICITY (MULTI): ICD-10-CM

## 2024-06-06 LAB
ANION GAP SERPL CALC-SCNC: 20 MMOL/L (ref 10–20)
ATRIAL RATE: 89 BPM
BUN SERPL-MCNC: 26 MG/DL (ref 6–23)
CALCIUM SERPL-MCNC: 10.2 MG/DL (ref 8.6–10.6)
CHLORIDE SERPL-SCNC: 93 MMOL/L (ref 98–107)
CO2 SERPL-SCNC: 31 MMOL/L (ref 21–32)
CREAT SERPL-MCNC: 4.43 MG/DL (ref 0.5–1.05)
EGFRCR SERPLBLD CKD-EPI 2021: 11 ML/MIN/1.73M*2
ERYTHROCYTE [DISTWIDTH] IN BLOOD BY AUTOMATED COUNT: 18.5 % (ref 11.5–14.5)
GLUCOSE SERPL-MCNC: 81 MG/DL (ref 74–99)
HCT VFR BLD AUTO: 35 % (ref 36–46)
HGB BLD-MCNC: 10.5 G/DL (ref 12–16)
MCH RBC QN AUTO: 28.8 PG (ref 26–34)
MCHC RBC AUTO-ENTMCNC: 30 G/DL (ref 32–36)
MCV RBC AUTO: 96 FL (ref 80–100)
NRBC BLD-RTO: 0 /100 WBCS (ref 0–0)
P AXIS: 40 DEGREES
P OFFSET: 192 MS
P ONSET: 137 MS
PLATELET # BLD AUTO: 328 X10*3/UL (ref 150–450)
POTASSIUM SERPL-SCNC: 4 MMOL/L (ref 3.5–5.3)
PR INTERVAL: 164 MS
Q ONSET: 219 MS
QRS COUNT: 15 BEATS
QRS DURATION: 84 MS
QT INTERVAL: 380 MS
QTC CALCULATION(BAZETT): 462 MS
QTC FREDERICIA: 433 MS
R AXIS: 21 DEGREES
RBC # BLD AUTO: 3.64 X10*6/UL (ref 4–5.2)
SODIUM SERPL-SCNC: 140 MMOL/L (ref 136–145)
T AXIS: 73 DEGREES
T OFFSET: 409 MS
VENTRICULAR RATE: 89 BPM
WBC # BLD AUTO: 7.3 X10*3/UL (ref 4.4–11.3)

## 2024-06-06 PROCEDURE — C1894 INTRO/SHEATH, NON-LASER: HCPCS | Performed by: INTERNAL MEDICINE

## 2024-06-06 PROCEDURE — 80048 BASIC METABOLIC PNL TOTAL CA: CPT | Performed by: NURSE PRACTITIONER

## 2024-06-06 PROCEDURE — 2500000005 HC RX 250 GENERAL PHARMACY W/O HCPCS: Performed by: INTERNAL MEDICINE

## 2024-06-06 PROCEDURE — 93458 L HRT ARTERY/VENTRICLE ANGIO: CPT | Performed by: INTERNAL MEDICINE

## 2024-06-06 PROCEDURE — 93005 ELECTROCARDIOGRAM TRACING: CPT | Mod: 59

## 2024-06-06 PROCEDURE — 99152 MOD SED SAME PHYS/QHP 5/>YRS: CPT | Performed by: INTERNAL MEDICINE

## 2024-06-06 PROCEDURE — 93451 RIGHT HEART CATH: CPT | Mod: CCI | Performed by: INTERNAL MEDICINE

## 2024-06-06 PROCEDURE — C1887 CATHETER, GUIDING: HCPCS | Performed by: INTERNAL MEDICINE

## 2024-06-06 PROCEDURE — 2550000001 HC RX 255 CONTRASTS: Performed by: INTERNAL MEDICINE

## 2024-06-06 PROCEDURE — 93460 R&L HRT ART/VENTRICLE ANGIO: CPT | Performed by: INTERNAL MEDICINE

## 2024-06-06 PROCEDURE — 2720000007 HC OR 272 NO HCPCS: Performed by: INTERNAL MEDICINE

## 2024-06-06 PROCEDURE — 2780000003 HC OR 278 NO HCPCS: Performed by: INTERNAL MEDICINE

## 2024-06-06 PROCEDURE — 85027 COMPLETE CBC AUTOMATED: CPT | Performed by: NURSE PRACTITIONER

## 2024-06-06 PROCEDURE — 2500000004 HC RX 250 GENERAL PHARMACY W/ HCPCS (ALT 636 FOR OP/ED): Performed by: INTERNAL MEDICINE

## 2024-06-06 PROCEDURE — 36415 COLL VENOUS BLD VENIPUNCTURE: CPT | Performed by: NURSE PRACTITIONER

## 2024-06-06 PROCEDURE — 7100000010 HC PHASE TWO TIME - EACH INCREMENTAL 1 MINUTE: Performed by: INTERNAL MEDICINE

## 2024-06-06 PROCEDURE — 7100000009 HC PHASE TWO TIME - INITIAL BASE CHARGE: Performed by: INTERNAL MEDICINE

## 2024-06-06 RX ORDER — DIPHENHYDRAMINE HYDROCHLORIDE 50 MG/ML
INJECTION INTRAMUSCULAR; INTRAVENOUS AS NEEDED
Status: DISCONTINUED | OUTPATIENT
Start: 2024-06-06 | End: 2024-06-06 | Stop reason: HOSPADM

## 2024-06-06 RX ORDER — LIDOCAINE HYDROCHLORIDE 10 MG/ML
INJECTION, SOLUTION EPIDURAL; INFILTRATION; INTRACAUDAL; PERINEURAL AS NEEDED
Status: DISCONTINUED | OUTPATIENT
Start: 2024-06-06 | End: 2024-06-06 | Stop reason: HOSPADM

## 2024-06-06 RX ORDER — IODIXANOL 320 MG/ML
INJECTION, SOLUTION INTRAVASCULAR AS NEEDED
Status: DISCONTINUED | OUTPATIENT
Start: 2024-06-06 | End: 2024-06-06 | Stop reason: HOSPADM

## 2024-06-06 RX ORDER — ACETAMINOPHEN 500 MG
1000 TABLET ORAL EVERY 8 HOURS PRN
COMMUNITY

## 2024-06-06 RX ORDER — TENAPANOR 31.9 MG/1
30 TABLET, FILM COATED ORAL 2 TIMES DAILY
COMMUNITY

## 2024-06-06 RX ORDER — MIDAZOLAM HYDROCHLORIDE 1 MG/ML
INJECTION, SOLUTION INTRAMUSCULAR; INTRAVENOUS AS NEEDED
Status: DISCONTINUED | OUTPATIENT
Start: 2024-06-06 | End: 2024-06-06 | Stop reason: HOSPADM

## 2024-06-06 RX ORDER — FENTANYL CITRATE 50 UG/ML
INJECTION, SOLUTION INTRAMUSCULAR; INTRAVENOUS AS NEEDED
Status: DISCONTINUED | OUTPATIENT
Start: 2024-06-06 | End: 2024-06-06 | Stop reason: HOSPADM

## 2024-06-06 RX ORDER — HEPARIN SODIUM 1000 [USP'U]/ML
INJECTION, SOLUTION INTRAVENOUS; SUBCUTANEOUS AS NEEDED
Status: DISCONTINUED | OUTPATIENT
Start: 2024-06-06 | End: 2024-06-06 | Stop reason: HOSPADM

## 2024-06-06 RX ORDER — VERAPAMIL HYDROCHLORIDE 2.5 MG/ML
INJECTION, SOLUTION INTRAVENOUS AS NEEDED
Status: DISCONTINUED | OUTPATIENT
Start: 2024-06-06 | End: 2024-06-06 | Stop reason: HOSPADM

## 2024-06-06 ASSESSMENT — COLUMBIA-SUICIDE SEVERITY RATING SCALE - C-SSRS
6. HAVE YOU EVER DONE ANYTHING, STARTED TO DO ANYTHING, OR PREPARED TO DO ANYTHING TO END YOUR LIFE?: NO
1. IN THE PAST MONTH, HAVE YOU WISHED YOU WERE DEAD OR WISHED YOU COULD GO TO SLEEP AND NOT WAKE UP?: NO
2. HAVE YOU ACTUALLY HAD ANY THOUGHTS OF KILLING YOURSELF?: NO

## 2024-06-06 NOTE — POST-PROCEDURE NOTE
Physician Transition of Care Summary  Invasive Cardiovascular Lab    Procedure Date: 6/6/2024  Attending:    * Devang Elizalde - Primary  Resident/Fellow/Other Assistant: Surgeons and Role:     * Eric Leon MD - Fellow    Indications:   Pre-op Diagnosis     * Nonrheumatic mitral valve stenosis [I34.2]     * Diastolic congestive heart failure, unspecified HF chronicity (Multi) [I50.30]    Post-procedure diagnosis:   Post-op Diagnosis     * Nonrheumatic mitral valve stenosis [I34.2]     * Diastolic congestive heart failure, unspecified HF chronicity (Multi) [I50.30]    Procedure(s):   Left And Right Heart Cath, Without LV  06360 - NH R & L HRT CATH WINJX HRT ART& L VENTR IMG        Procedure Findings:   LHC  6F RRA->TR    Right dominant  Angiographically normal coronaries  LVEDP 15    RHC  5F R brachial vein->manual    RA 15 (v wave 27)  RV 88/2  PA 86/42 (62)  PCWP 35    PA 61%  FA 93%  CO/CI 5.7/3.2    Overall:  Angiographically normal coronaries in a right dominant system.  Severely elevated filling pressures with preserved cardiac output, likely due to mitral stenosis given near normal LVEDP 15 with PCWP 35.    Recommendations:  Additional volume removal with dialysis sessions  Ongoing eval for renal transplant    Complications:   none    Stents/Implants:   Implants       No implant documentation for this case.              Estimated Blood Loss:   10 mL    Anesthesia: Moderate Sedation Anesthesia Staff: No anesthesia staff entered.    Any Specimen(s) Removed:   Order Name Source Comment Collection Info Order Time   BASIC METABOLIC PANEL Blood, Venous  Collected By: Salome Tobias RN 6/6/2024  7:08 AM     Release result to [employer]   Immediate        CBC Blood, Venous  Collected By: Salome Tobias RN 6/6/2024  7:08 AM     Release result to MyChart   Immediate            Disposition:   Home      Electronically signed by: Eric Leon MD, 6/6/2024 9:22 AM

## 2024-06-06 NOTE — PROGRESS NOTES
Pharmacy Medication History Review    Rekha Gonzáles is a 53 y.o. female admitted for Nonrheumatic mitral valve stenosis. Pharmacy reviewed the patient's [id number] medications and allergies for accuracy.    The list below reflects the updated PTA list. Comments regarding how patient may be taking medications differently can be found in the Admit Orders Activity  Prior to Admission Medications   Prescriptions Last Dose Informant Patient Reported?   ALPRAZolam (Xanax) 0.5 mg tablet 6/5/2024 Self Yes   Sig: Take 1 tablet (0.5 mg) by mouth 2 times a day as needed for anxiety. Or sedation up to 30 days   CeleXA 20 mg tablet 6/5/2024 Self Yes   Sig: Take 1 tablet (20 mg) by mouth once daily.   Renal Caps 1 mg capsule 6/5/2024 Self Yes   Sig: Take 1 capsule by mouth once daily.   acetaminophen (Tylenol) 500 mg tablet 6/5/2024 Self Yes   Sig: Take 2 tablets (1,000 mg) by mouth every 8 hours if needed for mild pain (1 - 3).   albuterol 90 mcg/actuation inhaler Past Week Self Yes   Sig: INHALE 2 PUFFS BY MOUTH AS INSTRUCTED EVERY 4 HOURS AS NEEDED FOR WHEEZING/SHORTNESS OF BREATH.   ascorbic acid (Vitamin C) 250 mg tablet 6/5/2024 Self Yes   Sig: Take 1 tablet (250 mg) by mouth once daily.   cholecalciferol (Vitamin D3) 50 mcg (2,000 unit) capsule 6/5/2024 Self Yes   Sig: Take 1 capsule (50 mcg) by mouth early in the morning..   cyanocobalamin, vitamin B-12, 500 mcg tablet, sublingual 6/3/2024 Self Yes   Sig: Place 1 Dose under the tongue 3 (three) times a week.   levothyroxine (Synthroid, Levoxyl) 50 mcg tablet 6/5/2024 Self Yes   Sig: Take 1 tablet (50 mcg) by mouth once daily.   omeprazole (PriLOSEC) 20 mg DR capsule 6/5/2024 Self Yes   Sig: Take 1 capsule (20 mg) by mouth once daily.   rosuvastatin (Crestor) 10 mg tablet 6/5/2024 Self No   Sig: Take 1 tablet (10 mg) by mouth once daily.   tenapanor (Xphozah) 30 mg tablet 6/5/2024 Self Yes   Sig: Take 30 mg by mouth 2 times a day.   zinc sulfate 50 mg zinc (220  mg) tablet 6/5/2024 Self Yes   Sig: Take 1 tablet (50 mg of elemental zinc) by mouth once daily.      Facility-Administered Medications: None        The list below reflects the updated allergy list. Please review each documented allergy for additional clarification and justification.  Allergies  Reviewed by Beka Talbot PharmD on 6/6/2024        Severity Reactions Comments    Penicillins Not Specified Unknown     Adhesive Tape-silicones Low Rash             Patient declines M2B at discharge. Pharmacy has been updated to [facility] in [place].    Sources used to complete the med history include out patient fill history, OARRS, and patient interview- good historian.      Below are additional concerns with the patient's PTA list.  Patient is no longer taking (Amlodipine, or Lanthanum chewable tabs)    Medications ADDED:  Xphozah 30mg   Tylenol 500  Medications CHANGED:  none  Medications REMOVED:   Amlodipine  Lanthanum chewable tabs      Beka Talbot PharmD  Transitions of Care Pharmacist  [facility]  Please reach out via Secure Chat for questions, or if no response call [employer] or [facility]era [facility]

## 2024-06-17 ENCOUNTER — TELEPHONE (OUTPATIENT)
Dept: TRANSPLANT | Facility: HOSPITAL | Age: 54
End: 2024-06-17
Payer: MEDICARE

## 2024-06-19 LAB
ATRIAL RATE: 89 BPM
P AXIS: 40 DEGREES
P OFFSET: 192 MS
P ONSET: 137 MS
PR INTERVAL: 164 MS
Q ONSET: 219 MS
QRS COUNT: 15 BEATS
QRS DURATION: 84 MS
QT INTERVAL: 380 MS
QTC CALCULATION(BAZETT): 462 MS
QTC FREDERICIA: 433 MS
R AXIS: 21 DEGREES
T AXIS: 73 DEGREES
T OFFSET: 409 MS
VENTRICULAR RATE: 89 BPM

## 2024-06-20 ENCOUNTER — APPOINTMENT (OUTPATIENT)
Dept: TRANSPLANT | Facility: HOSPITAL | Age: 54
End: 2024-06-20
Payer: MEDICARE

## 2024-06-20 ENCOUNTER — APPOINTMENT (OUTPATIENT)
Dept: TRANSPLANT | Facility: HOSPITAL | Age: 54
End: 2024-06-20
Payer: COMMERCIAL

## 2024-06-20 ENCOUNTER — TELEMEDICINE (OUTPATIENT)
Dept: CARDIAC SURGERY | Facility: HOSPITAL | Age: 54
End: 2024-06-20
Payer: MEDICARE

## 2024-06-20 DIAGNOSIS — I34.2 NONRHEUMATIC MITRAL VALVE STENOSIS: Primary | ICD-10-CM

## 2024-06-20 PROCEDURE — 99203 OFFICE O/P NEW LOW 30 MIN: CPT | Performed by: THORACIC SURGERY (CARDIOTHORACIC VASCULAR SURGERY)

## 2024-06-24 NOTE — PROGRESS NOTES
This was a virtual visit over audio and video    In summary she is 53 years of age referred for assessment of mitral valve disease.  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  She is active on the waiting list for kidney transplantation at the [facility].  She has also been assessed at [facility] for listing for renal transplantation.    Her echocardiogram demonstrated moderate to severe mitral stenosis and moderate mitral regurgitation with severely calcified mitral annulus.  She also has mild to moderate aortic stenosis.    Her main complaint is occasional shortness of breath on exertion and it seems to be related to her dialysis schedule.  She denies any chest pain or palpitations or any symptoms of cardiovascular disease.    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

## 2024-06-27 ENCOUNTER — OFFICE VISIT (OUTPATIENT)
Dept: TRANSPLANT | Facility: HOSPITAL | Age: 54
End: 2024-06-27
Payer: MEDICARE

## 2024-06-27 DIAGNOSIS — F41.1 GENERALIZED ANXIETY DISORDER: ICD-10-CM

## 2024-06-27 DIAGNOSIS — Z01.818 PRE-TRANSPLANT EVALUATION FOR KIDNEY TRANSPLANT: Primary | ICD-10-CM

## 2024-06-27 PROCEDURE — 90791 PSYCH DIAGNOSTIC EVALUATION: CPT | Performed by: PSYCHOLOGIST

## 2024-06-27 NOTE — PROGRESS NOTES
BEHAVIORAL HEALTH: PSYCHOLOGICAL EVALUATION FOR TRANSPLANT CANDIDACY    Patient's Name: Rekha Gonzáles  :  1970  MRN:  [id number]    Diagnosis: Pre-transplant evaluation for kidney transplant; generalized anxiety disorder    Date of Service: 2024    Start Time: 11:00 am   End Time: 12:00 pm  Location: Transplant [facility], [facility]     Reason for Referral: Rekha Gonzáles has been diagnosed with End-stage renal disease in the setting of amyloidosis and follow-up treatment and is being seen for a psychological assessment as one part of a comprehensive evaluation for consideration for transplantation. She has had two prior episodes opened in 2019; one was closed for loss of follow-up and the other for weight loss. In her 2024 meeting with the  she reported a history of depression and anxiety for which she is successfully treated with Celexa. She has a prior episode of psychotherapy and no history of psychiatric hospitalizations.    PROBLEM LIST   Patient Active Problem List   Diagnosis    Abdominal pain, epigastric    Abnormal CT scan, pelvis    Abnormal results of liver function studies    Acute blood loss anemia    Acute midline low back pain with bilateral sciatica    Acute renal failure (CMS-HCC)    Acute respiratory failure with hypoxemia (Multi)    Albuminuria    Allergy, unspecified, initial encounter    Anaphylactic shock, unspecified, initial encounter    Anemia due to multiple mechanisms    Anxiety    Asthma ([facility]-HCC)    Bacteremia    Central line insertion site infection    Chemotherapy-induced nausea    Chronic superficial gastritis without bleeding    Coagulation defect, unspecified (Multi)    Daytime sleepiness    Deficiency of multiple nutrient elements    Dependence on hemodialysis (CMS-HCC)    Diarrhea    End stage renal failure on dialysis (Multi)    Fever, unspecified    Gastric polyps    Gastroesophageal reflux disease with  esophagitis without hemorrhage    Generalized anxiety disorder    Headache, unspecified    Heartburn    Hepatic steatosis    Hiatal hernia    Hx of migraines    Hyperkalemia    Hyperphosphatemia    Hypertension secondary to other renal disorders    Hypertension, essential    Hypertensive urgency    Hypoglycemia, unspecified    Hypotension of hemodialysis    Immunodeficiency (Multi)    Intestinal malabsorption ([facility]-HCC)    Light chain (AL) amyloidosis (Multi)    Metabolic acidosis    Migraines    Moderate protein-calorie malnutrition (Multi)    Morbid (severe) obesity due to excess calories (Multi)    Occult blood in stools    Obstructive sleep apnea syndrome    Pain, unspecified    Pancytopenia due to antineoplastic chemotherapy (CMS-HCC)    Pars defect of lumbar spine    Pneumonia    Pre-diabetes    Pruritus, unspecified    Rash and nonspecific skin eruption    S/P autologous bone marrow transplantation (Multi)    Secondary hyperparathyroidism of renal origin (Multi)    Shortness of breath    Sepsis due to unspecified Staphylococcus (Multi)    Chronic kidney disease, unspecified    Syncope    Volume overload    Nonrheumatic mitral valve stenosis    Diastolic congestive heart failure (Multi)      CURRENT MEDICATIONS  Current Outpatient Medications:     acetaminophen (Tylenol) 500 mg tablet, Take 2 tablets (1,000 mg) by mouth every 8 hours if needed for mild pain (1 - 3)., Disp: , Rfl:     albuterol 90 mcg/actuation inhaler, INHALE 2 PUFFS BY MOUTH AS INSTRUCTED EVERY 4 HOURS AS NEEDED FOR WHEEZING/SHORTNESS OF BREATH., Disp: , Rfl:     ALPRAZolam (Xanax) 0.5 mg tablet, Take 1 tablet (0.5 mg) by mouth 2 times a day as needed for anxiety. Or sedation up to 30 days, Disp: , Rfl:     ascorbic acid (Vitamin C) 250 mg tablet, Take 1 tablet (250 mg) by mouth once daily., Disp: , Rfl:     CeleXA 20 mg tablet, Take 1 tablet (20 mg) by mouth once daily., Disp: , Rfl:     cholecalciferol (Vitamin D3) 50 mcg (2,000 unit)  capsule, Take 1 capsule (50 mcg) by mouth early in the morning.., Disp: , Rfl:     cyanocobalamin, vitamin B-12, 500 mcg tablet, sublingual, Place 1 Dose under the tongue 3 (three) times a week., Disp: , Rfl:     levothyroxine (Synthroid, Levoxyl) 50 mcg tablet, Take 1 tablet (50 mcg) by mouth once daily., Disp: , Rfl:     omeprazole (PriLOSEC) 20 mg DR capsule, Take 1 capsule (20 mg) by mouth once daily., Disp: , Rfl:     Renal Caps 1 mg capsule, Take 1 capsule by mouth once daily., Disp: , Rfl:     rosuvastatin (Crestor) 10 mg tablet, Take 1 tablet (10 mg) by mouth once daily., Disp: 30 tablet, Rfl: 11    tenapanor (Xphozah) 30 mg tablet, Take 30 mg by mouth 2 times a day., Disp: , Rfl:     zinc sulfate 50 mg zinc (220 mg) tablet, Take 1 tablet (50 mg of elemental zinc) by mouth once daily., Disp: , Rfl:     PRESENTING INFORMATION: Rekha was seen today alone. Mood was  serious/concerned . Affect was mood congruent. Motivation to move forward with transplant was considered high. She expressed her frustration and concern about a recent episode she had with cardiology. She has been advised she has a mitral valve problem and she is under the impression it might need to be replaced, but she has not been able to get feedback following her last appointment. She has reached out to the cardiologist--she can't message him through [employer] and said she called his  (this was the contact information she was given) and no one answered. I assured her that this is an issue that would worry anyone and suggested she check in with her  coordinator. She indicated she would also reach out to her coordinator at [facility] to see if the cardiologist had reached out to them.     I asked about her prior evaluation for listing. She explained she had been seen at [facility] and was treated pretty rudely about her weight. She felt much better about her experience here. She had bariatric surgery and subsequently lost over 100 pounds. She  "continues to follow the restrictions required for gastric bypass. I inquired about absorption issues with her medications and she said her stomach is often upset. She has wondered if her medications are aggravating her. She has a lot of loose stools.    Regarding her cardiac function, she feels worn out after dialysis at times and has oxygen to use at home. She said the surgeon seemed unwilling to move to surgical repair but she isn't sure what the concern is. She is listed at [facility] and is aware there needs to be some coordination of treatment between the two facilities.    ADHERENCE  Medications:   -Organization: keeps in bottles   -Missed doses: never or rarely--she's flipping bottles over to make sure she's taken them   -Refills: Pharmacy provides medications on time.   -Use of reminders: none.    Diet:   -Type of diet followed:  She had a full bypass. She is following a bariatric surgery diet and is also on fluid restriction. She started at 283 pounds prior to the surgery,   -Barriers to following this diet: none    Attendance at Appointments:   -Dialysis: hemodialysis     -start date of treatment: 5 years ago--She noted that she has amyloid and did chemo, which hurt her kidneys. Then she had a stem cell transplant and that finished off her kidneys.    -transportation to and from sessions: drives self.    -time of day: 7 am    -length of session: 3.5 hrs    -activities during session: watching something on TV or devices and reading    -missed sessions no, has rescheduled only for healthcare appts   -Medical appointments: Yes   -Mental health: not currently   -PT/Rehabilitation: N/A    SUBSTANCE USE  Tobacco:  never    Alcohol: will have a sip of something very infrequently    Illicit Substance Use: Never used recreational drugs    Substance Disorder Treatment: none reported    PSYCHOLOGICAL HISTORY   Current Mood: She described her mood as \"up and down\" and added that she is trying to stay positive.    Previously " "Diagnosed Psychological Disorders:   Mood Disorders:   -Depression/Anxiety: anhedonia, lack of motivation, feels down/mopes, some panicked feelings (tremulous inside), couldn't sleep when her anxiety was first provoked, ruminated on \"what if?\" questions. She is sleeping better now, takes Xanax to sleep but is observing some tolerance of the medication.  Developmental/Behavioral Disorders: none formally diagnosed  Cognitive Disorders: none formally diagnosed  Personality Disorders: none formally diagnosed  Psychotic Disorders: none formally diagnosed    Treatment for Psychological Disorders:  Outpatient counseling: She saw a psychologist once, didn't mesh (psychologist was overly aggressive and judgmental).   Pharmacological management:   -previous psychotropic medications used: ?  -current psychotropic medications: citalopram, alprazolam (sleep)  -prescribing provider: PCP  Inpatient treatment: none    Suicidal/Homicidal Tendencies: When she first started going through all of this she didn't want to be a burden, wasn't sure about her future. She never wanted to end her life but wondered if all the strife was worth it.    Coping Strategies: She does some crafting (finds projects on IG and FB). She used to walk a lot and had a [employer] membership, but does not have the energy for much activity now. She sits in the sun to read.    Suicide Risk Assessment  Risk factors: chronic medical illness  Protective factors: marriage/partnered, children, and expressed desire to live    Mental Status Exam:  General Appearance: well groomed, appropriate eye contact  Attitude/Behavior: cooperative  Motor: no psychomotor agitation or retardation, no tremor or other abnormal movements  Speech: normal rate, volume, prosody  Gait/Station: [facility]  Mood: euthymic   Affect: euthymic, full-range  Thought Process: linear, goal directed  Thought Associations: no loosening of associations  Thought Content: normal  Perception: no perceptual " "abnormalities noted  Sensorium: alert and oriented to person, place, time and situation  Insight: intact  Judgment: intact  Cognition: cognitively intact to conversational testing with respect to attention, orientation, fund of knowledge, recent and remote memory, and language    SOCIAL HISTORY  Siblings: three sisters and two brothers (she's second from bottom; is getting closer to oldest sister)  Parents: mother is here (here today), is 80 this year and in reasonably good health. \"She's my rock.\" Her father is .  Relationship Status:  25 years  Children: two (19 and 23)  Education: high school, some vocational education/college  Occupation: She hasn't been able to work because of her health. She worked for the state for many years.  Employment Status: disability  Current living situation: She lives with her  and both children still live at home.    IMPRESSIONS  Adherence: Ms. Gonzáles appears to have remained adherent with medical interventions through the duration of her illness. To her credit, she has maintained the diet recommended for patients post-bariatric bypass surgery.    Relapse Risk Issues: no concerns--Alcohol use has been minimal but she was educated on the potential detrimental effects of alcohol use post-bypass surgery given that it will enter the bloodstream much faster than formerly.    Psychological Issues: Ms. Do experienced symptoms of anxiety and depression, largely in response to her health concerns. She had one unsuccessful trial of psychotherapy that was probably more harmful than helpful to her because of the provider's attitude. She is on citalopram, which is helpful. Her ruminations have affected her ability to sleep. She was prescribed alprazolam, which I have explained to her is not intended for long-term use and is habit-forming. She would be interested in exploring other strategies to help her sleep. I proposed that she could speak with her PCP about trying " "trazodone.     Social Support: Ms. Gonzáles's social support plan is appropriate.    PLAN  This assessment was conducted as part of a comprehensive evaluation with input provided by all members of the multidisciplinary team. Recommendations are not developed by any one team member and might be modified over time with additional care visits, input from other providers, or new test results.    Ms. Gonzáles is appropriate for listing from a psychological perspective. Recommendations below are intended to help her improve her mental health and overall well-being while she waits for a transplant.    Psychotherapy is encouraged but does not need to be mandatory for listing. I can assist her in finding a new therapist who might be a better fit for her, and have offered to see her again as she continues to progress through this evaluation process.  Ms. Gonzáles should minimize use of alprazolam. We discussed today other non-addictive medications for assisting with sleep, including trazodone. She expressed willingness to discuss this with her PCP.  Adherence with the principles of good sleep hygiene is encouraged. These include:  Stick to fixed sleep and wake-up times  Use the bed only for sleep and intimacy (no work or use of electronic devices)  Avoid taking daytime naps, especially after noon  Establish a \"settling down\" routine at night to set the expectation for sleep  Turn electronic devices to emit yellow light at twilight  Get out of bed after 20-30 minutes of not sleeping and do not return until feeling sleepy  Try guided meditations, calming music, or sleep stories after getting into bed to break thought patterns     Crystal Kingsley, PhD  Clinical Psychologist, [facility] [place]  Clinical Professor, Department of Psychiatry  [facility]     "

## 2024-07-18 ENCOUNTER — TELEPHONE (OUTPATIENT)
Dept: TRANSPLANT | Facility: HOSPITAL | Age: 54
End: 2024-07-18
Payer: MEDICARE

## 2024-07-18 NOTE — TELEPHONE ENCOUNTER
I returned the patients call with concerns about most recent cardiology visit and plan of care. LVM.

## 2025-01-02 ENCOUNTER — TELEPHONE (OUTPATIENT)
Facility: HOSPITAL | Age: 55
End: 2025-01-02
Payer: MEDICARE

## 2025-01-10 ENCOUNTER — TELEPHONE (OUTPATIENT)
Facility: HOSPITAL | Age: 55
End: 2025-01-10
Payer: MEDICARE

## 2025-01-13 ENCOUNTER — DOCUMENTATION (OUTPATIENT)
Facility: HOSPITAL | Age: 55
End: 2025-01-13
Payer: MEDICARE

## 2025-02-10 ENCOUNTER — DOCUMENTATION (OUTPATIENT)
Facility: HOSPITAL | Age: 55
End: 2025-02-10
Payer: MEDICARE

## 2025-02-13 ENCOUNTER — COMMITTEE REVIEW (OUTPATIENT)
Facility: HOSPITAL | Age: 55
End: 2025-02-13
Payer: MEDICARE

## 2025-02-14 NOTE — COMMITTEE REVIEW
Evaluation Date: 2/6/2024   Committee Review Date: 2/13/2025   Organ being evaluated for: Kidney     Transplant Phase:  Evaluation   Transplant Status: Active     Referring Physician:     Transplant Physician: Rufino Purcell     Primary Diagnosis:      Committee Members:   Danielle Alarcon RDN, LD   FinancTez Jones; Judie Baltazar   Pharmacy Lillie Pedro, PharmD   Psychology Crystal Kingsley, PhD    Yun Schuler [facility]   Transplant Jamshid Gross RN; Sherin Quezada, DILIP; Miranda Rodríguez, DILIP; Olesya Crump, DILIP; Apolonia Jacobo   Transplant Nephrology Fareed Holly MD; Madeline Skinner MD; Krista Bedolla MD; Jenna Modi MD   Transplant Surgery Gene Oquendo MD; Trevon Urias MD; Yolanda Meade MD; Shira Yap MD; Juan Askew MD       Eligibility:  None     Relative contraindications:  pt lost to follow     Absolute contraindications:  None         Committee Review Decision:    The candidate's evaluation was presented and discussed at the Transplant Multidisciplinary Selection Conference. After review of the candidate's diagnosis and the evaluations of the multidisciplinary team members, the committee made the following recommendations:     Close the evaluation due to the reason below. Patient can be re-referred once the condition is resolved.    Other: pt lost to follow    Resolution: Close evaluation due to unable to contact patient.

## 2025-02-17 ENCOUNTER — DOCUMENTATION (OUTPATIENT)
Facility: HOSPITAL | Age: 55
End: 2025-02-17
Payer: MEDICARE

## 2025-04-30 ENCOUNTER — DOCUMENTATION (OUTPATIENT)
Dept: TRANSPLANT | Facility: HOSPITAL | Age: 55
End: 2025-04-30
Payer: MEDICARE

## 2025-05-30 DIAGNOSIS — I25.10 CORONARY ARTERY CALCIFICATION SEEN ON CT SCAN: ICD-10-CM

## 2025-06-02 RX ORDER — ROSUVASTATIN CALCIUM 10 MG/1
10 TABLET, COATED ORAL DAILY
Qty: 90 TABLET | Refills: 3 | Status: SHIPPED | OUTPATIENT
Start: 2025-06-02

## (undated) DEVICE — BAND, VASCULAR, RADIAL HEMOSTAT, REGULAR 24CM

## (undated) DEVICE — CATHETER, ANGIO, IMPULSE, FR4, 5 FR X 100 CM

## (undated) DEVICE — CATHETER, OPTITORQUE, 5FR, TIG, 1H/100CM

## (undated) DEVICE — NITINOL KIT, GLIDESHEATH, 5FR

## (undated) DEVICE — CATHETER, WEDGE PRESSURE, BALLOON, DOUBLE LUMEN, 5 FR, 110 CM

## (undated) DEVICE — SHEATH, GLIDESHEATH, SLENDER, 6FR 10CM

## (undated) DEVICE — GUIDEWIRE, INQWIRE, 3MM J, .035 X 210CM, FIXED

## (undated) DEVICE — INTRODUCER, GLIDESHEATH SLENDER A-KIT, 5FR 10CM